# Patient Record
Sex: FEMALE | Race: BLACK OR AFRICAN AMERICAN | NOT HISPANIC OR LATINO | Employment: OTHER | URBAN - METROPOLITAN AREA
[De-identification: names, ages, dates, MRNs, and addresses within clinical notes are randomized per-mention and may not be internally consistent; named-entity substitution may affect disease eponyms.]

---

## 2017-01-10 ENCOUNTER — OFFICE VISIT (OUTPATIENT)
Dept: INTERNAL MEDICINE | Facility: CLINIC | Age: 72
End: 2017-01-10

## 2017-01-10 VITALS
BODY MASS INDEX: 32.58 KG/M2 | OXYGEN SATURATION: 100 % | HEART RATE: 83 BPM | HEIGHT: 68 IN | DIASTOLIC BLOOD PRESSURE: 86 MMHG | WEIGHT: 215 LBS | SYSTOLIC BLOOD PRESSURE: 140 MMHG

## 2017-01-10 DIAGNOSIS — E66.9 OBESITY (BMI 30.0-34.9): Chronic | ICD-10-CM

## 2017-01-10 DIAGNOSIS — H81.10 BPPV (BENIGN PAROXYSMAL POSITIONAL VERTIGO), UNSPECIFIED LATERALITY: ICD-10-CM

## 2017-01-10 DIAGNOSIS — J30.5 NON-SEASONAL ALLERGIC RHINITIS DUE TO FOOD: ICD-10-CM

## 2017-01-10 DIAGNOSIS — I10 ESSENTIAL HYPERTENSION: Primary | Chronic | ICD-10-CM

## 2017-01-10 PROCEDURE — 99214 OFFICE O/P EST MOD 30 MIN: CPT | Performed by: HOSPITALIST

## 2017-01-10 RX ORDER — LEVOCETIRIZINE DIHYDROCHLORIDE 5 MG/1
5 TABLET, FILM COATED ORAL DAILY
COMMUNITY
End: 2017-01-10 | Stop reason: SDUPTHER

## 2017-01-10 RX ORDER — LEVOCETIRIZINE DIHYDROCHLORIDE 5 MG/1
5 TABLET, FILM COATED ORAL DAILY
Qty: 30 TABLET | Refills: 5 | Status: SHIPPED | OUTPATIENT
Start: 2017-01-10 | End: 2017-01-10 | Stop reason: SDUPTHER

## 2017-01-10 RX ORDER — LEVOCETIRIZINE DIHYDROCHLORIDE 5 MG/1
5 TABLET, FILM COATED ORAL DAILY
Qty: 90 TABLET | Refills: 3 | Status: SHIPPED | OUTPATIENT
Start: 2017-01-10 | End: 2019-06-12

## 2017-01-10 NOTE — PROGRESS NOTES
"Subjective   Maryjane Gaspar is a 71 y.o. female.   Abnormal Calcium; Essential hypertension; and Med Refill      HPI Comments: She has been doing well.  Her BP is stable. She states it was running a little higher than normal in [place]. She has been having some dizziness with position changes since the beginning of the year. She has episodes 2-3 a day. It is vertiginous. Not associated with nausea it last only a few seconds.        The following portions of the patient's history were reviewed and updated as appropriate: allergies, current medications, past family history, past medical history, past social history, past surgical history and problem list.    Review of Systems   Constitutional: Negative for activity change and appetite change.   HENT: Positive for postnasal drip and rhinorrhea. Negative for congestion and ear pain.    Eyes: Negative for discharge and itching.   Respiratory: Negative for apnea and chest tightness.    Cardiovascular: Negative for chest pain and leg swelling.   Genitourinary: Negative for difficulty urinating and dyspareunia.   Musculoskeletal: Negative for arthralgias and back pain.       Objective  Blood pressure 140/86, pulse 83, height 68\" (172.7 cm), weight 215 lb (97.5 kg), SpO2 100 %.      Physical Exam   Constitutional: She is oriented to person, place, and time. She appears well-developed and well-nourished.   HENT:   Head: Normocephalic and atraumatic.   Eyes: Conjunctivae and EOM are normal. Pupils are equal, round, and reactive to light.   Neck: Normal range of motion. Neck supple.   Cardiovascular: Normal rate, regular rhythm, normal heart sounds and intact distal pulses.    Pulmonary/Chest: Effort normal and breath sounds normal.   Neurological: She is alert and oriented to person, place, and time.   Skin: Skin is warm and dry.   Psychiatric: She has a normal mood and affect. Her behavior is normal. Judgment and thought content normal.       Assessment/Plan   Maryjane was " seen today for abnormal calcium, essential hypertension and med refill.    Diagnoses and all orders for this visit:    Essential hypertension    Obesity (BMI 30.0-34.9)    Non-seasonal allergic rhinitis due to food  -     Discontinue: levocetirizine (XYZAL) 5 MG tablet; Take 1 tablet by mouth Daily.  -     levocetirizine (XYZAL) 5 MG tablet; Take 1 tablet by mouth Daily.    BPPV (benign paroxysmal positional vertigo), unspecified laterality  -     Ambulatory Referral to Physical Therapy

## 2017-01-10 NOTE — MR AVS SNAPSHOT
Maryjane Gaspar   1/10/2017 9:15 AM   Office Visit    Dept Phone:  [phone]   Encounter #:  [id number]    Provider:  Ralph Hinson MD   Department:  [facility] INTERNAL MEDICINE AND ENDOCRINOLOGY [place]                Your Full Care Plan              Where to Get Your Medications      You can get these medications from any pharmacy     Bring a paper prescription for each of these medications     levocetirizine 5 MG tablet            Your Updated Medication List          This list is accurate as of: 1/10/17 11:18 AM.  Always use your most recent med list.                amLODIPine 5 MG tablet   Commonly known as:  NORVASC   Take 1 tablet by mouth Daily.       aspirin 81 MG tablet       levocetirizine 5 MG tablet   Commonly known as:  XYZAL   Take 1 tablet by mouth Daily.       MULTIVITAMINS PO               We Performed the Following     Ambulatory Referral to Physical Therapy       You Were Diagnosed With        Codes Comments    Essential hypertension    -  Primary ICD-10-CM: I10  ICD-9-CM: 401.9     Obesity (BMI 30.0-34.9)     ICD-10-CM: E66.9  ICD-9-CM: 278.00     Non-seasonal allergic rhinitis due to food     ICD-10-CM: J30.5  ICD-9-CM: 477.1     BPPV (benign paroxysmal positional vertigo), unspecified laterality     ICD-10-CM: H81.10  ICD-9-CM: 386.11       Instructions     None    Patient Instructions History      Upcoming Appointments     Visit Type Date Time Department    FOLLOW UP 1/10/2017  9:15 AM [facility]    FOLLOW UP 4/10/2017  9:30 AM [facility]    SUBSEQUENT MEDICARE WELLNESS 9/15/2017  9:45 AM [facility]      MyChart Signup     Our records indicate that your [facility] [employer] account has been deactivated. If you would like to reactivate your account, please email [email or web address] or call [phone] to talk to our [employer] staff.             Other Info from Your Visit           Your Appointments     Apr 10, 2017  9:30 AM EDT   Follow  "Up with Ralph Hinson MD   [facility] [place] (--)    [address]  [facility] [address] [phone]           Arrive 15 minutes prior to appointment.            Sep 15, 2017  9:45 AM EDT   Subsequent Medicare Wellness with Ralph Hinson MD   [facility] [place] (--)    [address]  [facility] [address] [phone]              Allergies     No Known Allergies      Reason for Visit     Abnormal Calcium     Essential hypertension     Med Refill           Vital Signs     Blood Pressure Pulse Height Weight Oxygen Saturation Body Mass Index    140/86 (BP Location: Left arm, Patient Position: Lying) 83 68\" (172.7 cm) 215 lb (97.5 kg) 100% 32.69 kg/m2    Smoking Status                   Former Smoker           Problems and Diagnoses Noted     Nasal inflammation due to allergen    Benign positional vertigo    High blood pressure    Obesity        "

## 2017-02-02 ENCOUNTER — HOSPITAL ENCOUNTER (OUTPATIENT)
Dept: PHYSICAL THERAPY | Facility: HOSPITAL | Age: 72
Setting detail: THERAPIES SERIES
Discharge: HOME | End: 2017-02-02

## 2017-02-02 DIAGNOSIS — H81.10 BPPV (BENIGN PAROXYSMAL POSITIONAL VERTIGO), UNSPECIFIED LATERALITY: Primary | ICD-10-CM

## 2017-02-02 PROCEDURE — 97162 PT EVAL MOD COMPLEX 30 MIN: CPT | Performed by: PHYSICAL THERAPIST

## 2017-02-02 PROCEDURE — G8981 BODY POS CURRENT STATUS: HCPCS | Performed by: PHYSICAL THERAPIST

## 2017-02-02 PROCEDURE — G8982 BODY POS GOAL STATUS: HCPCS | Performed by: PHYSICAL THERAPIST
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
## 2017-02-08 ENCOUNTER — HOSPITAL ENCOUNTER (OUTPATIENT)
Dept: PHYSICAL THERAPY | Facility: HOSPITAL | Age: 72
Setting detail: THERAPIES SERIES
Discharge: HOME | End: 2017-02-08

## 2017-02-08 DIAGNOSIS — H81.10 BPPV (BENIGN PAROXYSMAL POSITIONAL VERTIGO), UNSPECIFIED LATERALITY: Primary | ICD-10-CM

## 2017-02-08 PROCEDURE — 97112 NEUROMUSCULAR REEDUCATION: CPT | Performed by: PHYSICAL THERAPIST
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
## 2017-02-26 DIAGNOSIS — I10 ESSENTIAL HYPERTENSION: Chronic | ICD-10-CM

## 2017-02-27 RX ORDER — AMLODIPINE BESYLATE 5 MG/1
TABLET ORAL
Qty: 90 TABLET | Refills: 1 | Status: SHIPPED | OUTPATIENT
Start: 2017-02-27 | End: 2017-08-25 | Stop reason: SDUPTHER

## 2017-03-13 ENCOUNTER — DOCUMENTATION (OUTPATIENT)
Dept: PHYSICAL THERAPY | Facility: HOSPITAL | Age: 72
End: 2017-03-13

## 2017-03-13 DIAGNOSIS — H81.10 BPPV (BENIGN PAROXYSMAL POSITIONAL VERTIGO), UNSPECIFIED LATERALITY: Primary | ICD-10-CM

## 2017-03-13 NOTE — THERAPY DISCHARGE NOTE
Outpatient Physical Therapy Discharge Summary         Patient Name: Maryjane Gaspar  : 1945  MRN: [id number]    Today's Date: 3/13/2017    Visit Dx:    ICD-10-CM ICD-9-CM   1. BPPV (benign paroxysmal positional vertigo), unspecified laterality H81.10 386.11             PT OP Goals       17 0900       PT Short Term Goals    STG 1 Patient to report reduction of dizziness at least 75% in frequency.  -SILVIA     STG 1 Progress Met  -SILVIA     STG 2 Patient to be independent in repositioning techniques.  -SILVIA     STG 2 Progress Met  -SILVIA     STG 3 Patient to demonstrate negative positional testing  -SILVIA     STG 3 Progress Met  -SILVIA       User Key  (r) = Recorded By, (t) = Taken By, (c) = Cosigned By    Initials Name Provider Type    SILVIA Vargas, PT Physical Therapist          OP PT Discharge Summary  Date of Discharge: 17  Reason for Discharge: All goals achieved  Outcomes Achieved: Able to achieve all goals within established timeline  Discharge Destination: Home with home program  Discharge Instructions: Patient responded very favorably to repositioning techniques.  Reported near symptom resolution with just one visit.  All goals were met and she was independent with self repositioning techniques    Attended 2 visits, all goals met.  No need for further care.      Ranjan Vargas, PT  3/13/2017

## 2017-04-12 ENCOUNTER — OFFICE VISIT (OUTPATIENT)
Dept: INTERNAL MEDICINE | Facility: CLINIC | Age: 72
End: 2017-04-12

## 2017-04-12 VITALS
TEMPERATURE: 97.9 F | SYSTOLIC BLOOD PRESSURE: 134 MMHG | HEIGHT: 68 IN | BODY MASS INDEX: 32.13 KG/M2 | WEIGHT: 212 LBS | DIASTOLIC BLOOD PRESSURE: 82 MMHG | HEART RATE: 73 BPM | OXYGEN SATURATION: 100 %

## 2017-04-12 DIAGNOSIS — J40 BRONCHITIS: Primary | ICD-10-CM

## 2017-04-12 PROCEDURE — 99213 OFFICE O/P EST LOW 20 MIN: CPT | Performed by: HOSPITALIST

## 2017-04-12 RX ORDER — METHYLPREDNISOLONE 4 MG/1
TABLET ORAL
Qty: 21 TABLET | Refills: 0 | Status: SHIPPED | OUTPATIENT
Start: 2017-04-12 | End: 2017-04-13 | Stop reason: SDUPTHER

## 2017-04-12 RX ORDER — AZITHROMYCIN 250 MG/1
TABLET, FILM COATED ORAL
Qty: 6 TABLET | Refills: 0 | Status: SHIPPED | OUTPATIENT
Start: 2017-04-12 | End: 2017-04-13 | Stop reason: SDUPTHER

## 2017-04-12 NOTE — PROGRESS NOTES
Subjective   Maryjane Gaspar is a 72 y.o. female. Essential hypertension; Obesity (BMI 30.0-34.9); BPPV (benign paroxysmal positional vertigo), unspecified lat; Cough; URI; and phlegm from chest (sometimes red tinged)         HPI Comments: She has been sick for 3 weeks. Still coughing up grey tannish mucous. She has mild shortness of breath and wheezing. She also has concerns about the discoloration of her lips that remains since the swelling of her lips went down. She wanted a referral to some one who specializes in AA skin but I was not aware of any practitioner's in this area with those expertise.      The following portions of the patient's history were reviewed and updated as appropriate: allergies, current medications, past family history, past medical history, past social history, past surgical history and problem list.    Review of Systems   Constitutional: Positive for fatigue. Negative for fever.   HENT: Negative for ear discharge and ear pain.    Eyes: Negative for discharge and itching.   Respiratory: Positive for shortness of breath and wheezing.    Cardiovascular: Negative for chest pain and leg swelling.   Genitourinary: Negative for difficulty urinating and dyspareunia.       Objective   Vitals:    04/12/17 1220   BP: 134/82   Pulse: 73   Temp: 97.9 °F (36.6 °C)   SpO2: 100%       Physical Exam   Constitutional: She appears well-developed and well-nourished.   HENT:   Head: Normocephalic and atraumatic.   Right Ear: External ear normal.   Left Ear: External ear normal.   Eyes: EOM are normal. Pupils are equal, round, and reactive to light.   Neck: Normal range of motion. Neck supple.   Cardiovascular: Normal rate, regular rhythm, normal heart sounds and intact distal pulses.    Pulmonary/Chest: Effort normal and breath sounds normal.       Assessment/Plan   Maryjane was seen today for essential hypertension, obesity (bmi 30.0-34.9), bppv (benign paroxysmal positional vertigo), unspecified lat, cough,  uri and phlegm from chest.    Diagnoses and all orders for this visit:    Bronchitis  -     Discontinue: azithromycin (ZITHROMAX Z-HI) 250 MG tablet; Take 2 tablets the first day, then 1 tablet daily for 4 days.  -     Discontinue: MethylPREDNISolone (MEDROL, HI,) 4 MG tablet; Take as directed on package instructions.        Results for orders placed or performed in visit on 10/13/16   PTH, Intact   Result Value Ref Range    PTH, Intact 45 15 - 65 pg/mL

## 2017-04-13 ENCOUNTER — TELEPHONE (OUTPATIENT)
Dept: INTERNAL MEDICINE | Facility: CLINIC | Age: 72
End: 2017-04-13

## 2017-04-13 DIAGNOSIS — J40 BRONCHITIS: ICD-10-CM

## 2017-04-13 RX ORDER — AZITHROMYCIN 250 MG/1
TABLET, FILM COATED ORAL
Qty: 6 TABLET | Refills: 0 | Status: SHIPPED | OUTPATIENT
Start: 2017-04-13 | End: 2017-09-15

## 2017-04-13 RX ORDER — METHYLPREDNISOLONE 4 MG/1
TABLET ORAL
Qty: 21 TABLET | Refills: 0 | Status: SHIPPED | OUTPATIENT
Start: 2017-04-13 | End: 2017-09-15

## 2017-04-13 NOTE — TELEPHONE ENCOUNTER
----- Message from Linda Kenny sent at 4/13/2017  8:46 AM EDT -----  Contact: PATIENT   RE: RX PRESCRIBED YESTERDAY    DR DOMINGO PRESCRIBED A ZPACK AND A MEDROL DOSE PACK FOR THIS PATIENT DURING HER VISIT YESTERDAY. THEY WERE SENT TO HER MAIL ORDER PHARMACY AND SHOULD HAVE BEEN SENT TO [facility]. PLEASE CANCEL ORDER SENT TO MAIL ORDER.    CALL BACK #[phone]

## 2017-04-20 ENCOUNTER — TELEPHONE (OUTPATIENT)
Dept: INTERNAL MEDICINE | Facility: CLINIC | Age: 72
End: 2017-04-20

## 2017-04-20 DIAGNOSIS — R05.9 COUGH: Primary | ICD-10-CM

## 2017-04-20 RX ORDER — BENZONATATE 200 MG/1
200 CAPSULE ORAL 3 TIMES DAILY PRN
Qty: 60 CAPSULE | Refills: 0 | Status: SHIPPED | OUTPATIENT
Start: 2017-04-20 | End: 2017-09-15

## 2017-04-20 NOTE — TELEPHONE ENCOUNTER
----- Message from Gaby Ngo sent at 4/19/2017  8:24 AM EDT -----  Contact: pt  Pt saw dr sanchez last week for a slight productive cough, thick mucus build up ... Pt was given two medicines and she has completely finished both prescriptions and mucinax dm but is still having the same symptoms.    Please call patient back with next treatment.

## 2017-04-20 NOTE — TELEPHONE ENCOUNTER
Sent in a script for tessalon  This is a post viral cough  that will have to run its course and the tessalon with help reduce the coughing. If she is not better by next week. Have her come in for a chest xray.

## 2017-06-06 ENCOUNTER — TELEPHONE (OUTPATIENT)
Dept: INTERNAL MEDICINE | Facility: CLINIC | Age: 72
End: 2017-06-06

## 2017-06-06 DIAGNOSIS — S69.90XS FINGER INJURY, UNSPECIFIED LATERALITY, SEQUELA: Primary | ICD-10-CM

## 2017-06-06 NOTE — TELEPHONE ENCOUNTER
MS TAY INJURED HER FINGER WHILE AWAY IN [place] AND WAS REFERRED TO A HAND SURGEON THERE WHO HAD BEEN FOLLOWING ALONG WITH HER CARE. NOW THAT SHE IS BACK IN [place], SHE CALLED TO REQUEST A REFERRAL FROM DR DOMINGO TO BE ABLE TO SEE A HAND SURGEON HERE FOR CONTINUED FOLLOW UP CARE. THE INJURY IS CALLED MALLET FINGER.

## 2017-08-25 ENCOUNTER — TELEPHONE (OUTPATIENT)
Dept: INTERNAL MEDICINE | Facility: CLINIC | Age: 72
End: 2017-08-25

## 2017-08-25 DIAGNOSIS — I10 ESSENTIAL HYPERTENSION: Chronic | ICD-10-CM

## 2017-08-25 RX ORDER — AMLODIPINE BESYLATE 5 MG/1
5 TABLET ORAL DAILY
Qty: 90 TABLET | Refills: 0 | Status: SHIPPED | OUTPATIENT
Start: 2017-08-25 | End: 2017-09-15 | Stop reason: SDUPTHER

## 2017-08-25 RX ORDER — AMLODIPINE BESYLATE 5 MG/1
5 TABLET ORAL DAILY
Qty: 90 TABLET | Refills: 0 | Status: SHIPPED | OUTPATIENT
Start: 2017-08-25 | End: 2017-08-25 | Stop reason: SDUPTHER

## 2017-09-06 ENCOUNTER — TRANSCRIBE ORDERS (OUTPATIENT)
Dept: PHYSICAL THERAPY | Facility: CLINIC | Age: 72
End: 2017-09-06

## 2017-09-06 DIAGNOSIS — M20.012 MALLET FINGER, LEFT: Primary | ICD-10-CM

## 2017-09-12 ENCOUNTER — TREATMENT (OUTPATIENT)
Dept: PHYSICAL THERAPY | Facility: CLINIC | Age: 72
End: 2017-09-12

## 2017-09-12 DIAGNOSIS — M79.642 PAIN OF LEFT HAND: Primary | ICD-10-CM

## 2017-09-12 PROCEDURE — 97110 THERAPEUTIC EXERCISES: CPT | Performed by: PHYSICAL THERAPIST

## 2017-09-12 PROCEDURE — G8984 CARRY CURRENT STATUS: HCPCS | Performed by: PHYSICAL THERAPIST

## 2017-09-12 PROCEDURE — 97140 MANUAL THERAPY 1/> REGIONS: CPT | Performed by: PHYSICAL THERAPIST

## 2017-09-12 PROCEDURE — 97022 WHIRLPOOL THERAPY: CPT | Performed by: PHYSICAL THERAPIST

## 2017-09-12 PROCEDURE — G8985 CARRY GOAL STATUS: HCPCS | Performed by: PHYSICAL THERAPIST

## 2017-09-12 NOTE — PROGRESS NOTES
Physical Therapy Initial Evaluation and Plan of Care    Patient: Maryjane Gaspar   : 1945  Diagnosis/ICD-10 Code:  Pain of left hand [M79.642]  Referring practitioner: Paula Pozo MD  Date of Initial Visit: 2017  Today's Date: 2017  Patient seen for 1 sessions             Subjective Evaluation    History of Present Illness  Date of onset: 2017  Mechanism of injury: Pt was lifitng a backpack and she lifted with the middle finger of the left hand and she didn't have any pain, but about 10-15 minutes later she noticed that she couldn't move the tip of the left middle finger up. She worse a splint at all times for 12 weeks and then went another 4 weeks of wearing the splint at night and with activity. She now has c/o of some burning and aching in the left hand and stiffness with movement of the middle, ring, and small fingers.     Pt c/o some numbness in the hand and in the 4 fingers on the dorsum of the left hand       Patient Occupation: retired  Quality of life: good    Pain  Current pain ratin  At best pain ratin  At worst pain ratin  Quality: burning  Relieving factors: medications and rest  Aggravating factors: lifting (use of the left hand)  Progression: improved    Hand dominance: right    Treatments  Previous treatment: immobilization  Patient Goals  Patient goals for therapy: decreased pain, increased motion, increased strength and independence with ADLs/IADLs             Objective     Palpation     Additional Palpation Details  Mild TTP about the left long finger DIP joint     Active Range of Motion     Left Wrist   Normal active range of motion    Right Wrist   Normal active range of motion    Left Digits    Flexion   Index     MCP: 72    PIP: 89    DIP: 33  Middle     MCP: 82    PIP: 35    DIP: 3  Ring     MCP: 80    PIP: 52    DIP: 2  Little     MCP: 78    PIP: 40    DIP: 18    Strength/Myotome Testing     Left Wrist/Hand      (2nd hand position)     Trial  1: 24    Thumb Strength  Key/Lateral Pinch     [place] 1: 7  Palmar/Three-Point Pinch     Trial 1: 7    Right Wrist/Hand      (2nd hand position)     Trial 1: 75    Thumb Strength   Key/Lateral Pinch     Trial 1: 20  Palmar/Three-Point Pinch     Trial 1: 18         Assessment & Plan     Assessment  Impairments: abnormal muscle firing, abnormal muscle tone, abnormal or restricted ROM, activity intolerance, impaired physical strength, lacks appropriate home exercise program and pain with function  Assessment details: Patient is a 72 year old female who comes to physical therapy s/p left long finger mallet with prolonged immobilization and resulting stiffness in all fingers of the left hand resulting in pain, decreased ROM, decreased strength, and inability to perform all essential functional activities. Pt will benefit from skilled PT services to address the above issues.     Prognosis details:   SHORT TERM GOALS:   2 weeks    1. Pt to be I with HEP  2. Left hand/wrist AROM equal to that of the right hand/wrist to show improve mobility   3. Pt to report 0/10 pain at rest in the left hand    LONG TERM GOALS:    4 weeks  1. Pt to demonstrate left  strength equal to that of right  for improved ability to perform lifting activities  2. Pt to report being able to return to work full duty without limitation or exacerbation of symptoms  3. Pt to demonstrate ability to perform 20# box lift without pain in the left  hand      Functional Limitations: carrying objects, lifting, pulling, pushing, uncomfortable because of pain, reaching overhead and unable to perform repetitive tasks  Plan  Therapy options: will be seen for skilled physical therapy services  Planned modality interventions: cryotherapy, electrical stimulation/Russian stimulation, fluidotherapy, high voltage pulsed current (pain management), iontophoresis, microcurrent electrical stimulation, TENS, thermotherapy (hydrocollator packs) and ultrasound  Planned  therapy interventions: ADL retraining, body mechanics training, fine motor coordination training, flexibility, functional ROM exercises, home exercise program, IADL retraining, joint mobilization, manual therapy, motor coordination training, neuromuscular re-education, soft tissue mobilization, strengthening, stretching and therapeutic activities  Frequency: 2x week  Duration in weeks: 6        Manual Therapy:    12     mins  33458;  Therapeutic Exercise:    18     mins  41262;     Neuromuscular Edwin:        mins  46322;    Therapeutic Activity:          mins  35105;     Gait Training:           mins  49947;     Ultrasound:          mins  45083;    Electrical Stimulation:         mins  26774 ( );  Dry Needling          mins self-pay    Timed Treatment:   30   mins   Total Treatment:     74   mins    PT SIGNATURE: Brennen Espinal, MONISHA   DATE TREATMENT INITIATED: 9/12/2017    Initial Certification  Certification Period: 12/11/2017  I certify that the therapy services are furnished while this patient is under my care.  The services outlined above are required by this patient, and will be reviewed every 90 days.     PHYSICIAN: Paula Pozo MD      DATE:     Please sign and return via fax to [phone].. Thank you, [facility].

## 2017-09-15 ENCOUNTER — OFFICE VISIT (OUTPATIENT)
Dept: INTERNAL MEDICINE | Facility: CLINIC | Age: 72
End: 2017-09-15

## 2017-09-15 VITALS
DIASTOLIC BLOOD PRESSURE: 80 MMHG | WEIGHT: 214.73 LBS | BODY MASS INDEX: 32.65 KG/M2 | SYSTOLIC BLOOD PRESSURE: 142 MMHG | HEART RATE: 78 BPM | OXYGEN SATURATION: 99 %

## 2017-09-15 DIAGNOSIS — Z11.59 NEED FOR HEPATITIS C SCREENING TEST: ICD-10-CM

## 2017-09-15 DIAGNOSIS — I10 ESSENTIAL HYPERTENSION: Chronic | ICD-10-CM

## 2017-09-15 DIAGNOSIS — Z00.00 MEDICARE ANNUAL WELLNESS VISIT, SUBSEQUENT: Primary | ICD-10-CM

## 2017-09-15 LAB
ALBUMIN SERPL-MCNC: 4.3 G/DL (ref 3.2–4.8)
ALBUMIN/GLOB SERPL: 1.7 G/DL (ref 1.5–2.5)
ALP SERPL-CCNC: 106 U/L (ref 25–100)
ALT SERPL W P-5'-P-CCNC: 15 U/L (ref 7–40)
ANION GAP SERPL CALCULATED.3IONS-SCNC: 9 MMOL/L (ref 3–11)
AST SERPL-CCNC: 18 U/L (ref 0–33)
BILIRUB SERPL-MCNC: 0.7 MG/DL (ref 0.3–1.2)
BUN BLD-MCNC: 12 MG/DL (ref 9–23)
BUN/CREAT SERPL: 13.3 (ref 7–25)
CALCIUM SPEC-SCNC: 9.8 MG/DL (ref 8.7–10.4)
CHLORIDE SERPL-SCNC: 107 MMOL/L (ref 99–109)
CO2 SERPL-SCNC: 27 MMOL/L (ref 20–31)
CREAT BLD-MCNC: 0.9 MG/DL (ref 0.6–1.3)
GFR SERPL CREATININE-BSD FRML MDRD: 75 ML/MIN/1.73
GLOBULIN UR ELPH-MCNC: 2.5 GM/DL
GLUCOSE BLD-MCNC: 87 MG/DL (ref 70–100)
HCV AB SER DONR QL: NORMAL
POTASSIUM BLD-SCNC: 3.7 MMOL/L (ref 3.5–5.5)
PROT SERPL-MCNC: 6.8 G/DL (ref 5.7–8.2)
SODIUM BLD-SCNC: 143 MMOL/L (ref 132–146)

## 2017-09-15 PROCEDURE — G0444 DEPRESSION SCREEN ANNUAL: HCPCS | Performed by: NURSE PRACTITIONER

## 2017-09-15 PROCEDURE — 86803 HEPATITIS C AB TEST: CPT | Performed by: NURSE PRACTITIONER

## 2017-09-15 PROCEDURE — G0439 PPPS, SUBSEQ VISIT: HCPCS | Performed by: NURSE PRACTITIONER

## 2017-09-15 PROCEDURE — 80053 COMPREHEN METABOLIC PANEL: CPT | Performed by: NURSE PRACTITIONER

## 2017-09-15 RX ORDER — AMLODIPINE BESYLATE 5 MG/1
5 TABLET ORAL DAILY
Qty: 90 TABLET | Refills: 3 | Status: SHIPPED | OUTPATIENT
Start: 2017-09-15 | End: 2018-09-16 | Stop reason: SDUPTHER

## 2017-09-15 NOTE — PROGRESS NOTES
QUICK REFERENCE INFORMATION:  The ABCs of the Annual Wellness Visit    Subsequent Medicare Wellness Visit    HEALTH RISK ASSESSMENT    1945    Recent Hospitalizations:  No hospitalization(s) within the last year..        Current Medical Providers:  Patient Care Team:  VAL Carver as PCP - General (Internal Medicine)  MARIA DEL ROSARIO Church as PCP - Claims Attributed        Smoking Status:  History   Smoking Status   • Former Smoker   • Quit date: 1984   Smokeless Tobacco   • Former User       Alcohol Consumption:  History   Alcohol Use   • Yes     Comment: Red wine 3-4 times a month       Depression Screen:   PHQ-2/PHQ-9 Depression Screening 9/15/2017   Little interest or pleasure in doing things 0   Feeling down, depressed, or hopeless 0   Trouble falling or staying asleep, or sleeping too much -   Feeling tired or having little energy -   Poor appetite or overeating -   Feeling bad about yourself - or that you are a failure or have let yourself or your family down -   Trouble concentrating on things, such as reading the newspaper or watching television -   Moving or speaking so slowly that other people could have noticed. Or the opposite - being so fidgety or restless that you have been moving around a lot more than usual -   Thoughts that you would be better off dead, or of hurting yourself in some way -   Total Score 0   If you checked off any problems, how difficult have these problems made it for you to do your work, take care of things at home, or get along with other people? -       Health Habits and Functional and Cognitive Screening:  Functional & Cognitive Status 9/15/2017   Do you have difficulty preparing food and eating? No   Do you have difficulty bathing yourself? No   Do you have difficulty getting dressed? No   Do you have difficulty using the toilet? No   Do you have difficulty moving around from place to place? No   In the past year have you fallen or experienced a near fall? No   Do  you need help using the phone?  No   Are you deaf or do you have serious difficulty hearing?  No   Do you need help with transportation? No   Do you need help shopping? No   Do you need help preparing meals?  No   Do you need help with housework?  No   Do you need help with laundry? No   Do you need help taking your medications? No   Do you need help managing money? No   Do you have difficulty concentrating, remembering or making decisions? -       Health Habits  Current Diet: Well Balanced Diet  Dental Exam: Up to date  Eye Exam: Up to date  Exercise (times per week): 0 times per week  Current Exercise Activities Include: None      Does the patient have evidence of cognitive impairment? Yes    Aspirin use counseling: Taking ASA appropriately as indicated      Recent Lab Results:  CMP:  Lab Results   Component Value Date    BUN 7 (L) 09/06/2016    CREATININE 1.00 09/06/2016    EGFRIFAFRI 66 09/06/2016    BCR 7.0 09/06/2016     09/06/2016    K 3.6 09/06/2016    CO2 32.0 (H) 09/06/2016    CALCIUM 10.6 (H) 09/06/2016    ALBUMIN 4.30 09/06/2016    LABIL2 1.6 09/06/2016    BILITOT 0.6 09/06/2016    ALKPHOS 78 09/06/2016    AST 25 09/06/2016    ALT 16 09/06/2016     Lipid Panel:  Lab Results   Component Value Date    CHOL 159 09/06/2016    TRIG 89 09/06/2016    HDL 63 (H) 09/06/2016    LDLDIRECT 81 09/06/2016     HbA1c:       Visual Acuity:  No exam data present    Age-appropriate Screening Schedule:  Refer to the list below for future screening recommendations based on patient's age, sex and/or medical conditions. Orders for these recommended tests are listed in the plan section. The patient has been provided with a written plan.    Health Maintenance   Topic Date Due   • TDAP/TD VACCINES (1 - Tdap) 03/02/1965   • ZOSTER VACCINE  09/06/2016   • PNEUMOCOCCAL VACCINES (65+ LOW/MEDIUM RISK) (2 of 2 - PPSV23) 09/13/2017   • MAMMOGRAM  12/15/2017   • COLONOSCOPY  11/11/2021   • INFLUENZA VACCINE  Addressed         Subjective   History of Present Illness    Maryjane Gaspar is a 72 y.o. female who presents for an Subsequent Wellness Visit.    The following portions of the patient's history were reviewed and updated as appropriate: current medications, past social history, past surgical history and problem list.    Outpatient Medications Prior to Visit   Medication Sig Dispense Refill   • aspirin 81 MG tablet Take  by mouth.     • levocetirizine (XYZAL) 5 MG tablet Take 1 tablet by mouth Daily. 90 tablet 3   • Multiple Vitamin (MULTIVITAMINS PO) Take 1 tablet by mouth daily.     • amLODIPine (NORVASC) 5 MG tablet Take 1 tablet by mouth Daily. 90 tablet 0   • azithromycin (ZITHROMAX Z-HI) 250 MG tablet Take 2 tablets the first day, then 1 tablet daily for 4 days. 6 tablet 0   • benzonatate (TESSALON) 200 MG capsule Take 1 capsule by mouth 3 (Three) Times a Day As Needed for Cough. 60 capsule 0   • MethylPREDNISolone (MEDROL, HI,) 4 MG tablet Take as directed on package instructions. 21 tablet 0     No facility-administered medications prior to visit.        Patient Active Problem List   Diagnosis   • HTN (hypertension)   • Obesity (BMI 30.0-34.9)   • Umbilical hernia   • Osteoarthritis of knee   • Allergic rhinitis   • Lymphocytosis   • Hypercalcemia   • BPPV (benign paroxysmal positional vertigo)       Advance Care Planning:  has NO advance directive - not interested in additional information    Identification of Risk Factors:  Risk factors include: weight  and alcohol use.    Review of Systems   Constitutional: Negative for fatigue.   Respiratory: Negative for apnea, chest tightness and shortness of breath.    Cardiovascular: Negative for chest pain, palpitations and leg swelling.   Skin: Negative for color change.   Psychiatric/Behavioral: The patient is not nervous/anxious.    All other systems reviewed and are negative.      Compared to one year ago, the patient feels her physical health is the same.  Compared to one  year ago, the patient feels her mental health is the same.    Objective     Physical Exam   Constitutional: She is oriented to person, place, and time. She appears well-developed.   HENT:   Head: Normocephalic.   Eyes: Conjunctivae and EOM are normal. Pupils are equal, round, and reactive to light.   Neck: Neck supple. No thyromegaly present.   Cardiovascular: Normal rate, regular rhythm and normal heart sounds.    Pulmonary/Chest: Effort normal and breath sounds normal.   Lymphadenopathy:     She has no cervical adenopathy.   Neurological: She is alert and oriented to person, place, and time.   Skin: Skin is warm and dry.   Psychiatric: She has a normal mood and affect. Her behavior is normal. Judgment and thought content normal.   Nursing note and vitals reviewed.      Vitals:    09/15/17 0921   BP: 142/80   Pulse: 78   SpO2: 99%   Weight: 214 lb 11.7 oz (97.4 kg)       Body mass index is 32.65 kg/(m^2).  Discussed the patient's BMI with her. The BMI is above average; BMI management plan is completed.    Assessment/Plan   Patient Self-Management and Personalized Health Advice  The patient has been provided with information about: diet, exercise and weight management and preventive services including:   · Advance directive, Influenza vaccine.    Visit Diagnoses:    ICD-10-CM ICD-9-CM   1. Medicare annual wellness visit, subsequent Z00.00 V70.0   2. Essential hypertension I10 401.9   3. Need for hepatitis C screening test Z11.59 V73.89       Orders Placed This Encounter   Procedures   • Comprehensive Metabolic Panel   • Hepatitis C antibody       Outpatient Encounter Prescriptions as of 9/15/2017   Medication Sig Dispense Refill   • amLODIPine (NORVASC) 5 MG tablet Take 1 tablet by mouth Daily. 90 tablet 3   • aspirin 81 MG tablet Take  by mouth.     • levocetirizine (XYZAL) 5 MG tablet Take 1 tablet by mouth Daily. 90 tablet 3   • Multiple Vitamin (MULTIVITAMINS PO) Take 1 tablet by mouth daily.     •  [DISCONTINUED] amLODIPine (NORVASC) 5 MG tablet Take 1 tablet by mouth Daily. 90 tablet 0   • [DISCONTINUED] azithromycin (ZITHROMAX Z-HI) 250 MG tablet Take 2 tablets the first day, then 1 tablet daily for 4 days. 6 tablet 0   • [DISCONTINUED] benzonatate (TESSALON) 200 MG capsule Take 1 capsule by mouth 3 (Three) Times a Day As Needed for Cough. 60 capsule 0   • [DISCONTINUED] MethylPREDNISolone (MEDROL, HI,) 4 MG tablet Take as directed on package instructions. 21 tablet 0     No facility-administered encounter medications on file as of 9/15/2017.        Reviewed use of high risk medication in the elderly: yes  Reviewed for potential of harmful drug interactions in the elderly: yes    Follow Up:  No Follow-up on file.     An After Visit Summary and PPPS with all of these plans were given to the patient.

## 2017-09-21 ENCOUNTER — TREATMENT (OUTPATIENT)
Dept: PHYSICAL THERAPY | Facility: CLINIC | Age: 72
End: 2017-09-21

## 2017-09-21 DIAGNOSIS — M79.642 PAIN OF LEFT HAND: Primary | ICD-10-CM

## 2017-09-21 PROCEDURE — 97110 THERAPEUTIC EXERCISES: CPT | Performed by: PHYSICAL THERAPIST

## 2017-09-21 PROCEDURE — 97140 MANUAL THERAPY 1/> REGIONS: CPT | Performed by: PHYSICAL THERAPIST

## 2017-09-21 PROCEDURE — 97022 WHIRLPOOL THERAPY: CPT | Performed by: PHYSICAL THERAPIST

## 2017-09-25 NOTE — PROGRESS NOTES
Physical Therapy Daily Progress Note    Subjective   Maryjane Gaspar reports that her hand seems to be getting a little better but she is till very stiff in the middle, ring, and small fingers     Objective   See Exercise, Manual, and Modality Logs for complete treatment.       Assessment/Plan     Pt demonstrated improved overall ROM post manual therapy today. Will continue to progress as indicated     Progress per Plan of Care           Manual Therapy:    25     mins  29963;  Therapeutic Exercise:    18     mins  38188;     Neuromuscular Edwin:        mins  08626;    Therapeutic Activity:          mins  80066;     Gait Training:           mins  53434;     Ultrasound:          mins  89305;    Electrical Stimulation:         mins  04866 ( );  Dry Needling          mins self-pay    Timed Treatment:   43   mins   Total Treatment:     65   mins    Brennen Espinal, PT  Physical Therapist

## 2017-09-26 ENCOUNTER — TREATMENT (OUTPATIENT)
Dept: PHYSICAL THERAPY | Facility: CLINIC | Age: 72
End: 2017-09-26

## 2017-09-26 DIAGNOSIS — M79.642 PAIN OF LEFT HAND: Primary | ICD-10-CM

## 2017-09-26 PROCEDURE — 97140 MANUAL THERAPY 1/> REGIONS: CPT | Performed by: PHYSICAL THERAPIST

## 2017-09-26 PROCEDURE — 97110 THERAPEUTIC EXERCISES: CPT | Performed by: PHYSICAL THERAPIST

## 2017-09-27 NOTE — PROGRESS NOTES
Physical Therapy Daily Progress Note    Subjective   Maryjane Gaspar reports that she is still feeling stiff but she has been trying to use her hand more lately     Objective   See Exercise, Manual, and Modality Logs for complete treatment.       Assessment/Plan     Pt had improved ROM measurements in the DIP and PIP joints of all fingers, she is making steady progress with therapy and responding well to manual therapy so far.     Progress per Plan of Care           Manual Therapy:    28     mins  07355;  Therapeutic Exercise:    25     mins  75087;     Neuromuscular Edwin:        mins  50872;    Therapeutic Activity:          mins  87735;     Gait Training:           mins  17514;     Ultrasound:          mins  02971;    Electrical Stimulation:         mins  35314 ( );  Dry Needling          mins self-pay    Timed Treatment:   53   mins   Total Treatment:     70   mins    Brennen Espinal, PT  Physical Therapist

## 2017-09-28 ENCOUNTER — TREATMENT (OUTPATIENT)
Dept: PHYSICAL THERAPY | Facility: CLINIC | Age: 72
End: 2017-09-28

## 2017-09-28 DIAGNOSIS — M79.642 PAIN OF LEFT HAND: Primary | ICD-10-CM

## 2017-09-28 PROCEDURE — 97140 MANUAL THERAPY 1/> REGIONS: CPT | Performed by: PHYSICAL THERAPIST

## 2017-09-28 PROCEDURE — 97018 PARAFFIN BATH THERAPY: CPT | Performed by: PHYSICAL THERAPIST

## 2017-09-29 NOTE — PROGRESS NOTES
Physical Therapy Daily Progress Note    Subjective   Maryjane Gaspar reports that she feels like her hand is doing better, although it got more stiff with ice this week     Objective   See Exercise, Manual, and Modality Logs for complete treatment.       Assessment/Plan     Pt is progressing well, see scanned MD note in media section of epic for detailed objective measures.     Progress per Plan of Care           Manual Therapy:    40     mins  31546;  Therapeutic Exercise:         mins  53468;     Neuromuscular Edwin:        mins  60405;    Therapeutic Activity:          mins  62984;     Gait Training:           mins  25910;     Ultrasound:          mins  32128;    Electrical Stimulation:         mins  04941 ( );  Dry Needling          mins self-pay    Timed Treatment:   40   mins   Total Treatment:     62   mins    Brennen Espinal PT  Physical Therapist

## 2017-10-03 ENCOUNTER — TREATMENT (OUTPATIENT)
Dept: PHYSICAL THERAPY | Facility: CLINIC | Age: 72
End: 2017-10-03

## 2017-10-03 DIAGNOSIS — M79.642 PAIN OF LEFT HAND: Primary | ICD-10-CM

## 2017-10-03 PROCEDURE — 97110 THERAPEUTIC EXERCISES: CPT | Performed by: PHYSICAL THERAPIST

## 2017-10-03 PROCEDURE — 97140 MANUAL THERAPY 1/> REGIONS: CPT | Performed by: PHYSICAL THERAPIST

## 2017-10-05 ENCOUNTER — TREATMENT (OUTPATIENT)
Dept: PHYSICAL THERAPY | Facility: CLINIC | Age: 72
End: 2017-10-05

## 2017-10-05 DIAGNOSIS — M79.642 PAIN OF LEFT HAND: Primary | ICD-10-CM

## 2017-10-05 PROCEDURE — 97018 PARAFFIN BATH THERAPY: CPT | Performed by: PHYSICAL THERAPIST

## 2017-10-05 PROCEDURE — 97140 MANUAL THERAPY 1/> REGIONS: CPT | Performed by: PHYSICAL THERAPIST

## 2017-10-05 PROCEDURE — 97110 THERAPEUTIC EXERCISES: CPT | Performed by: PHYSICAL THERAPIST

## 2017-10-05 NOTE — PROGRESS NOTES
Physical Therapy Daily Progress Note    Subjective   Maryjane Gaspar reports that she is feeling a little better but she feels like her progress is still slow. She goes back to the [place] in 4 weeks     Objective   See Exercise, Manual, and Modality Logs for complete treatment.       Assessment/Plan     Pt continues to have a steady improvement in her finger and hand passive and active ROM. Will continue to progress as tolerated.     Progress per Plan of Care           Manual Therapy:    28     mins  54223;  Therapeutic Exercise:    30     mins  89701;     Neuromuscular Edwin:        mins  60696;    Therapeutic Activity:          mins  08932;     Gait Training:           mins  43647;     Ultrasound:          mins  54143;    Electrical Stimulation:         mins  25563 ( );  Dry Needling          mins self-pay    Timed Treatment:   58   mins   Total Treatment:     76   mins    Brennen Espinal, PT  Physical Therapist

## 2017-10-08 NOTE — PROGRESS NOTES
Physical Therapy Daily Progress Note    Subjective   Maryjane Gaspar reports that she is feeling slightly better, she still has trouble with bending the ends of her fingers and with gripping smaller items     Objective   See Exercise, Manual, and Modality Logs for complete treatment.       Assessment/Plan     Pt is making steady progress with therapy, she will be leaving [facility] for 3 weeks ater next week. Will focus on home program next week    Progress per Plan of Care           Manual Therapy:    35     mins  85074;  Therapeutic Exercise:    18     mins  42690;     Neuromuscular Edwin:        mins  79969;    Therapeutic Activity:          mins  75699;     Gait Training:           mins  22835;     Ultrasound:          mins  18174;    Electrical Stimulation:         mins  60144 ( );  Dry Needling          mins self-pay    Timed Treatment:   53   mins   Total Treatment:     74   mins    Brennen Espinal, PT  Physical Therapist

## 2017-10-10 ENCOUNTER — TREATMENT (OUTPATIENT)
Dept: PHYSICAL THERAPY | Facility: CLINIC | Age: 72
End: 2017-10-10

## 2017-10-10 DIAGNOSIS — M79.642 PAIN OF LEFT HAND: Primary | ICD-10-CM

## 2017-10-10 PROCEDURE — 97018 PARAFFIN BATH THERAPY: CPT | Performed by: PHYSICAL THERAPIST

## 2017-10-10 PROCEDURE — 97140 MANUAL THERAPY 1/> REGIONS: CPT | Performed by: PHYSICAL THERAPIST

## 2017-10-10 NOTE — PROGRESS NOTES
Physical Therapy Daily Progress Note    Subjective   Maryjane Gaspar reports that her fingers are feeling a little stiff today, she has one more visit before she goes to [place] for 3 weeks     Objective   See Exercise, Manual, and Modality Logs for complete treatment.       Assessment/Plan     Pt is making steady progress with therapy and with her ROM. Reinforced working on ROM and stretching at home this visit.     Progress per Plan of Care           Manual Therapy:    38     mins  23115;  Therapeutic Exercise:         mins  35697;     Neuromuscular Edwin:        mins  37599;    Therapeutic Activity:          mins  62388;     Gait Training:           mins  55554;     Ultrasound:          mins  21925;    Electrical Stimulation:         mins  64035 ( );  Dry Needling          mins self-pay    Timed Treatment:   38   mins   Total Treatment:     55   mins    Brennen Espinal PT  Physical Therapist

## 2017-10-12 ENCOUNTER — TREATMENT (OUTPATIENT)
Dept: PHYSICAL THERAPY | Facility: CLINIC | Age: 72
End: 2017-10-12

## 2017-10-12 DIAGNOSIS — M79.642 PAIN OF LEFT HAND: Primary | ICD-10-CM

## 2017-10-12 DIAGNOSIS — M20.012 MALLET FINGER OF LEFT HAND: ICD-10-CM

## 2017-10-12 PROCEDURE — 97140 MANUAL THERAPY 1/> REGIONS: CPT | Performed by: PHYSICAL THERAPIST

## 2017-10-12 PROCEDURE — G0283 ELEC STIM OTHER THAN WOUND: HCPCS | Performed by: PHYSICAL THERAPIST

## 2017-10-12 PROCEDURE — 97110 THERAPEUTIC EXERCISES: CPT | Performed by: PHYSICAL THERAPIST

## 2017-10-16 ENCOUNTER — FLU SHOT (OUTPATIENT)
Dept: INTERNAL MEDICINE | Facility: CLINIC | Age: 72
End: 2017-10-16

## 2017-10-16 DIAGNOSIS — Z23 FLU VACCINE NEED: Primary | ICD-10-CM

## 2017-10-16 PROCEDURE — G0008 ADMIN INFLUENZA VIRUS VAC: HCPCS | Performed by: NURSE PRACTITIONER

## 2017-10-16 PROCEDURE — 90662 IIV NO PRSV INCREASED AG IM: CPT | Performed by: NURSE PRACTITIONER

## 2017-10-16 NOTE — PROGRESS NOTES
Subjective   Maryjane Gaspar reports: All her fingers are stiff and painful to bend.    Objective   END FEEL:  Capsular end feel on all PIP and DIP joints  OBSERVATION:  Minimal extension lag on long finger    See Exercise, Manual, and Modality Logs for complete treatment.       Assessment/Plan  We have instructed the pt she needs to be very aggressive with her PROM on the fingers.  Appears to have adhesive capsulitis.  May consider a flexion glove.  Progress per Plan of Care           Manual Therapy:    20     mins  13153;  Therapeutic Exercise:    25     mins  68934;     Neuromuscular Edwin:        mins  58972;    Therapeutic Activity:          mins  18481;     Gait Training:           mins  82743;     Ultrasound:          mins  05741;   Iontophoresis          mins  16596   Electrical Stimulation:    20     mins  60898 ( );  Dry Needling          mins self-pay  Fluidotherapy          mins 40695    Timed Treatment:   45   mins   Total Treatment:     80   mins    Jignesh Jay, PT, CHT  Physical Therapist

## 2017-11-09 ENCOUNTER — TREATMENT (OUTPATIENT)
Dept: PHYSICAL THERAPY | Facility: CLINIC | Age: 72
End: 2017-11-09

## 2017-11-09 DIAGNOSIS — M20.012 MALLET FINGER OF LEFT HAND: ICD-10-CM

## 2017-11-09 DIAGNOSIS — M79.642 PAIN OF LEFT HAND: Primary | ICD-10-CM

## 2017-11-09 PROCEDURE — 97140 MANUAL THERAPY 1/> REGIONS: CPT | Performed by: PHYSICAL THERAPIST

## 2017-11-09 PROCEDURE — 97018 PARAFFIN BATH THERAPY: CPT | Performed by: PHYSICAL THERAPIST

## 2017-11-13 NOTE — PROGRESS NOTES
Physical Therapy Daily Progress Note    Subjective   Maryjane Gaspar reports that she feels like she has been making some progress, she reports that she used parafin while she was out of town and she worked on her ROM as well.     Objective   See Exercise, Manual, and Modality Logs for complete treatment.       Assessment/Plan     Pt is making steady progress with therapy, although it is slow. Measurements were taken today and she demonstrated improvement in ROM at all joints of the middle, ring, and small fingers of the left hand.     Progress per Plan of Care           Manual Therapy:    35     mins  97998;  Therapeutic Exercise:         mins  73566;     Neuromuscular Edwin:        mins  98963;    Therapeutic Activity:          mins  46418;     Gait Training:           mins  83594;     Ultrasound:          mins  67853;    Electrical Stimulation:         mins  15601 ( );  Dry Needling          mins self-pay    Timed Treatment:   35   mins   Total Treatment:     56   mins    Brennen Espinal, PT  Physical Therapist

## 2017-11-14 ENCOUNTER — TREATMENT (OUTPATIENT)
Dept: PHYSICAL THERAPY | Facility: CLINIC | Age: 72
End: 2017-11-14

## 2017-11-14 DIAGNOSIS — M79.642 PAIN OF LEFT HAND: Primary | ICD-10-CM

## 2017-11-14 DIAGNOSIS — M20.012 MALLET FINGER OF LEFT HAND: ICD-10-CM

## 2017-11-14 PROCEDURE — 97018 PARAFFIN BATH THERAPY: CPT | Performed by: PHYSICAL THERAPIST

## 2017-11-14 PROCEDURE — 97022 WHIRLPOOL THERAPY: CPT | Performed by: PHYSICAL THERAPIST

## 2017-11-14 PROCEDURE — 97140 MANUAL THERAPY 1/> REGIONS: CPT | Performed by: PHYSICAL THERAPIST

## 2017-11-16 ENCOUNTER — TREATMENT (OUTPATIENT)
Dept: PHYSICAL THERAPY | Facility: CLINIC | Age: 72
End: 2017-11-16

## 2017-11-16 DIAGNOSIS — M79.642 PAIN OF LEFT HAND: Primary | ICD-10-CM

## 2017-11-16 DIAGNOSIS — M20.012 MALLET FINGER OF LEFT HAND: ICD-10-CM

## 2017-11-16 PROCEDURE — 97018 PARAFFIN BATH THERAPY: CPT | Performed by: PHYSICAL THERAPIST

## 2017-11-16 PROCEDURE — 97140 MANUAL THERAPY 1/> REGIONS: CPT | Performed by: PHYSICAL THERAPIST

## 2017-11-16 PROCEDURE — 97110 THERAPEUTIC EXERCISES: CPT | Performed by: PHYSICAL THERAPIST

## 2017-11-16 NOTE — PROGRESS NOTES
Physical Therapy Daily Progress Note    Subjective   Maryjane Gaspar reports that she sarmiento been working on wrapping her hand and fingers with the coban     Objective   See Exercise, Manual, and Modality Logs for complete treatment.       Assessment/Plan     Pt is making incremental progress with therapy but her main limitation is still flexion of the DIP joints of the 3rd-5th fingers.     Progress per Plan of Care           Manual Therapy:    34     mins  94403;  Therapeutic Exercise:         mins  00941;     Neuromuscular Edwin:        mins  75808;    Therapeutic Activity:          mins  31480;     Gait Training:           mins  48873;     Ultrasound:          mins  65346;    Electrical Stimulation:         mins  63099 ( );  Dry Needling          mins self-pay    Timed Treatment:   34   mins   Total Treatment:     70   mins    Brennen Espinal, PT  Physical Therapist

## 2017-11-17 NOTE — PROGRESS NOTES
Physical Therapy Daily Progress Note    Subjective   Maryjane Gaspar reports that she was very sore the night after her last treatment. She thinks it is possibly due to doing the parafin with her hand wrapped with the fingers in flexion     Objective   See Exercise, Manual, and Modality Logs for complete treatment.       Assessment/Plan     Pt Is making incremental progress with therapy. Her main limitation is still flexion of the DIP joints of the 3rd-5th fingers.     Progress per Plan of Care           Manual Therapy:    28     mins  57553;  Therapeutic Exercise:    12     mins  68447;     Neuromuscular Edwin:        mins  14850;    Therapeutic Activity:          mins  35836;     Gait Training:           mins  30731;     Ultrasound:          mins  74515;    Electrical Stimulation:         mins  29788 ( );  Dry Needling          mins self-pay    Timed Treatment:   40   mins   Total Treatment:     60   mins    Brennen Espinal PT  Physical Therapist

## 2017-11-20 ENCOUNTER — TREATMENT (OUTPATIENT)
Dept: PHYSICAL THERAPY | Facility: CLINIC | Age: 72
End: 2017-11-20

## 2017-11-20 DIAGNOSIS — M20.012 MALLET FINGER OF LEFT HAND: ICD-10-CM

## 2017-11-20 DIAGNOSIS — M79.642 PAIN OF LEFT HAND: Primary | ICD-10-CM

## 2017-11-20 PROCEDURE — 97110 THERAPEUTIC EXERCISES: CPT | Performed by: PHYSICAL THERAPIST

## 2017-11-20 PROCEDURE — 97140 MANUAL THERAPY 1/> REGIONS: CPT | Performed by: PHYSICAL THERAPIST

## 2017-11-20 PROCEDURE — 97018 PARAFFIN BATH THERAPY: CPT | Performed by: PHYSICAL THERAPIST

## 2017-11-22 ENCOUNTER — TREATMENT (OUTPATIENT)
Dept: PHYSICAL THERAPY | Facility: CLINIC | Age: 72
End: 2017-11-22

## 2017-11-22 DIAGNOSIS — M20.012 MALLET FINGER OF LEFT HAND: ICD-10-CM

## 2017-11-22 DIAGNOSIS — M79.642 PAIN OF LEFT HAND: Primary | ICD-10-CM

## 2017-11-22 PROCEDURE — 97140 MANUAL THERAPY 1/> REGIONS: CPT | Performed by: PHYSICAL THERAPIST

## 2017-11-22 PROCEDURE — 97110 THERAPEUTIC EXERCISES: CPT | Performed by: PHYSICAL THERAPIST

## 2017-11-22 NOTE — PROGRESS NOTES
Physical Therapy Daily Progress Note    Subjective   Maryjane Gaspar reports that she is still working on the stretching at home and she is using the coban at home to hold her fingers in prolonged flexion     Objective   See Exercise, Manual, and Modality Logs for complete treatment.       Assessment/Plan     Pt is making incremental progress with her ROM, she is still lacking the most in flexion of the DIP joints on digits 3-5    Progress per Plan of Care           Manual Therapy:    28     mins  33676;  Therapeutic Exercise:    18     mins  63975;     Neuromuscular Edwin:        mins  23187;    Therapeutic Activity:          mins  53741;     Gait Training:           mins  77259;     Ultrasound:          mins  38801;    Electrical Stimulation:         mins  37974 ( );  Dry Needling          mins self-pay    Timed Treatment:   46   mins   Total Treatment:     65   mins    Brennen sEpinal, PT  Physical Therapist

## 2017-11-25 NOTE — PROGRESS NOTES
Subjective   Maryjane Gaspar reports: Biggest issue is that she still can't close her fist, last 3 fingers are still very stiff.    Objective   OBSERVATION: Minimal edema  END FEEL: capsular end feel of PIP and DIP joints of LF/RF/SF    See Exercise, Manual, and Modality Logs for complete treatment.       Assessment/Plan  Significant capsulitis of IP joints of RF/SF/LF, she is improving, but progress will be slow.  Progress per Plan of Care           Manual Therapy:    28     mins  63950;  Therapeutic Exercise:    20     mins  69829;     Neuromuscular Edwin:        mins  92873;    Therapeutic Activity:          mins  41690;     Gait Training:           mins  01476;     Ultrasound:          mins  51578;   Iontophoresis          mins  85642   Electrical Stimulation:         mins  72466 ( );  Dry Needling          mins self-pay  Fluidotherapy          mins 60741    Timed Treatment:   48   mins   Total Treatment:     63   mins    Jignesh Jay, PT  Physical Therapist

## 2017-11-27 ENCOUNTER — TREATMENT (OUTPATIENT)
Dept: PHYSICAL THERAPY | Facility: CLINIC | Age: 72
End: 2017-11-27

## 2017-11-27 DIAGNOSIS — M79.642 PAIN OF LEFT HAND: Primary | ICD-10-CM

## 2017-11-27 DIAGNOSIS — M20.012 MALLET FINGER OF LEFT HAND: ICD-10-CM

## 2017-11-27 PROCEDURE — 97110 THERAPEUTIC EXERCISES: CPT | Performed by: PHYSICAL THERAPIST

## 2017-11-27 PROCEDURE — 97140 MANUAL THERAPY 1/> REGIONS: CPT | Performed by: PHYSICAL THERAPIST

## 2017-11-27 PROCEDURE — 97018 PARAFFIN BATH THERAPY: CPT | Performed by: PHYSICAL THERAPIST

## 2017-11-29 NOTE — PROGRESS NOTES
Physical Therapy Daily Progress Note    Subjective   Maryjane Gaspar reports that she feels like her hand is getting better over time, she is going to be out of town for the next several weeks, but she will be using parafin at home and wrapping her fingers into flexion with coban     Objective   See Exercise, Manual, and Modality Logs for complete treatment.       Assessment/Plan     Pt is making steady progress with therapy and she shows improvement in her PROM>AROM of the fingers of the left hand     Progress per Plan of Care           Manual Therapy:    28     mins  28114;  Therapeutic Exercise:    16     mins  16612;     Neuromuscular Edwin:        mins  00603;    Therapeutic Activity:          mins  53024;     Gait Training:           mins  29903;     Ultrasound:          mins  98949;    Electrical Stimulation:         mins  10826 ( );  Dry Needling          mins self-pay    Timed Treatment:   44   mins   Total Treatment:     65   mins    Brennen Espinal, PT  Physical Therapist

## 2017-12-28 ENCOUNTER — TREATMENT (OUTPATIENT)
Dept: PHYSICAL THERAPY | Facility: CLINIC | Age: 72
End: 2017-12-28

## 2017-12-28 DIAGNOSIS — M79.642 PAIN OF LEFT HAND: Primary | ICD-10-CM

## 2017-12-28 DIAGNOSIS — M20.012 MALLET FINGER OF LEFT HAND: ICD-10-CM

## 2017-12-28 PROCEDURE — 97140 MANUAL THERAPY 1/> REGIONS: CPT | Performed by: PHYSICAL THERAPIST

## 2017-12-28 PROCEDURE — 97022 WHIRLPOOL THERAPY: CPT | Performed by: PHYSICAL THERAPIST

## 2017-12-28 PROCEDURE — 97110 THERAPEUTIC EXERCISES: CPT | Performed by: PHYSICAL THERAPIST

## 2018-01-02 NOTE — PROGRESS NOTES
Physical Therapy Daily Progress Note    Subjective   Maryjane Gaspar reports that she is feeling better overall, but she still has trouble with bending the ends of her fingers.     Objective   See Exercise, Manual, and Modality Logs for complete treatment.     See scanned MD note in epic media section     Assessment/Plan    Pt demonstrates an overall improvement in her AROM. Will consider possibility of static flexion splinting on her fingers at the next visit    Progress per Plan of Care           Manual Therapy:    36     mins  18854;  Therapeutic Exercise:    8     mins  23521;     Neuromuscular Edwin:        mins  54325;    Therapeutic Activity:          mins  86935;     Gait Training:           mins  67577;     Ultrasound:          mins  03865;    Electrical Stimulation:         mins  62533 ( );  Dry Needling          mins self-pay    Timed Treatment:   44   mins   Total Treatment:     65   mins    Brennen Espinal PT  Physical Therapist

## 2018-01-09 ENCOUNTER — TREATMENT (OUTPATIENT)
Dept: PHYSICAL THERAPY | Facility: CLINIC | Age: 73
End: 2018-01-09

## 2018-01-09 DIAGNOSIS — M79.642 PAIN OF LEFT HAND: Primary | ICD-10-CM

## 2018-01-09 DIAGNOSIS — M20.012 MALLET FINGER OF LEFT HAND: ICD-10-CM

## 2018-01-09 PROCEDURE — 97022 WHIRLPOOL THERAPY: CPT | Performed by: PHYSICAL THERAPIST

## 2018-01-09 PROCEDURE — 97035 APP MDLTY 1+ULTRASOUND EA 15: CPT | Performed by: PHYSICAL THERAPIST

## 2018-01-09 PROCEDURE — 97140 MANUAL THERAPY 1/> REGIONS: CPT | Performed by: PHYSICAL THERAPIST

## 2018-01-10 NOTE — PROGRESS NOTES
Physical Therapy Daily Progress Note    Subjective   Maryjane Gaspar reports that she is able to do the wrapping of the fingers with less discomfort and when she unwraps and opens her hand she doesn't have the rebound pain.     Objective   See Exercise, Manual, and Modality Logs for complete treatment.       Assessment/Plan     Pt was issued pre-ej static flexion splints for the PIP/DIP of the 3rd and 4th fingers of the left hand to facilitate improved isolated DIP flexion and composite flexion of all fingers.     Progress per Plan of Care           Manual Therapy:    32     mins  15011;  Therapeutic Exercise:         mins  30480;     Neuromuscular Edwin:        mins  54336;    Therapeutic Activity:          mins  78281;     Gait Training:           mins  68731;     Ultrasound:     16     mins  48369;    Electrical Stimulation:         mins  81749 ( );  Dry Needling          mins self-pay    Timed Treatment:   48   mins   Total Treatment:     70   mins    Brennen Espinal, PT  Physical Therapist

## 2018-01-11 ENCOUNTER — TREATMENT (OUTPATIENT)
Dept: PHYSICAL THERAPY | Facility: CLINIC | Age: 73
End: 2018-01-11

## 2018-01-11 DIAGNOSIS — M79.642 PAIN OF LEFT HAND: Primary | ICD-10-CM

## 2018-01-11 DIAGNOSIS — M20.012 MALLET FINGER OF LEFT HAND: ICD-10-CM

## 2018-01-11 PROCEDURE — 97140 MANUAL THERAPY 1/> REGIONS: CPT | Performed by: PHYSICAL THERAPIST

## 2018-01-11 PROCEDURE — 97022 WHIRLPOOL THERAPY: CPT | Performed by: PHYSICAL THERAPIST

## 2018-01-15 NOTE — PROGRESS NOTES
Physical Therapy Daily Progress Note    Subjective   Maryjane Gaspar reports that she has been consistent about wearing the static flexion splints since her last visit.     Objective   See Exercise, Manual, and Modality Logs for complete treatment.       Assessment/Plan     Slight improvement noted in finger flexion of the 3rd and 4th fingers of the left hand, less capsular tightness note throughout available ROM.     Progress per Plan of Care           Manual Therapy:    32     mins  19759;  Therapeutic Exercise:         mins  35287;     Neuromuscular Edwin:        mins  61543;    Therapeutic Activity:          mins  63181;     Gait Training:           mins  60631;     Ultrasound:          mins  87770;    Electrical Stimulation:         mins  40148 ( );  Dry Needling          mins self-pay    Timed Treatment:   32   mins   Total Treatment:     50   mins    Brennen Espinal PT  Physical Therapist

## 2018-01-18 ENCOUNTER — TREATMENT (OUTPATIENT)
Dept: PHYSICAL THERAPY | Facility: CLINIC | Age: 73
End: 2018-01-18

## 2018-01-18 DIAGNOSIS — M79.642 PAIN OF LEFT HAND: Primary | ICD-10-CM

## 2018-01-18 DIAGNOSIS — M20.012 MALLET FINGER OF LEFT HAND: ICD-10-CM

## 2018-01-18 PROCEDURE — 97022 WHIRLPOOL THERAPY: CPT | Performed by: PHYSICAL THERAPIST

## 2018-01-18 PROCEDURE — 97140 MANUAL THERAPY 1/> REGIONS: CPT | Performed by: PHYSICAL THERAPIST

## 2018-01-18 PROCEDURE — 97035 APP MDLTY 1+ULTRASOUND EA 15: CPT | Performed by: PHYSICAL THERAPIST

## 2018-01-19 NOTE — PROGRESS NOTES
Physical Therapy Daily Progress Note    Subjective   Maryjane Gaspar reports that she has been wearing the static flexion straps frequently at home while sleeping and several times throughout the day. She has noticed that it has been easier to bend the fingers.     Objective   See Exercise, Manual, and Modality Logs for complete treatment.       Assessment/Plan     Improved passive and active ROM of the fingers into flexion in the clinic today. Will continue therapy for joint mobilization and capsular stretching with continued use of the flexion straps at home.    Progress per Plan of Care           Manual Therapy:    28     mins  16743;  Therapeutic Exercise:         mins  15608;     Neuromuscular Edwin:        mins  86537;    Therapeutic Activity:          mins  23127;     Gait Training:           mins  46349;     Ultrasound:     16     mins  74441;    Electrical Stimulation:         mins  58588 ( );  Dry Needling          mins self-pay    Timed Treatment:   44   mins   Total Treatment:     65   mins    Brennen Espinal PT  Physical Therapist

## 2018-01-23 ENCOUNTER — TREATMENT (OUTPATIENT)
Dept: PHYSICAL THERAPY | Facility: CLINIC | Age: 73
End: 2018-01-23

## 2018-01-23 DIAGNOSIS — M79.642 PAIN OF LEFT HAND: Primary | ICD-10-CM

## 2018-01-23 DIAGNOSIS — M20.012 MALLET FINGER OF LEFT HAND: ICD-10-CM

## 2018-01-23 PROCEDURE — 97110 THERAPEUTIC EXERCISES: CPT | Performed by: PHYSICAL THERAPIST

## 2018-01-23 PROCEDURE — 97140 MANUAL THERAPY 1/> REGIONS: CPT | Performed by: PHYSICAL THERAPIST

## 2018-01-23 PROCEDURE — 97022 WHIRLPOOL THERAPY: CPT | Performed by: PHYSICAL THERAPIST

## 2018-01-25 ENCOUNTER — TREATMENT (OUTPATIENT)
Dept: PHYSICAL THERAPY | Facility: CLINIC | Age: 73
End: 2018-01-25

## 2018-01-25 DIAGNOSIS — M20.012 MALLET FINGER OF LEFT HAND: ICD-10-CM

## 2018-01-25 DIAGNOSIS — M79.642 PAIN OF LEFT HAND: Primary | ICD-10-CM

## 2018-01-25 PROCEDURE — 97110 THERAPEUTIC EXERCISES: CPT | Performed by: PHYSICAL THERAPIST

## 2018-01-25 PROCEDURE — 97140 MANUAL THERAPY 1/> REGIONS: CPT | Performed by: PHYSICAL THERAPIST

## 2018-01-25 PROCEDURE — 97035 APP MDLTY 1+ULTRASOUND EA 15: CPT | Performed by: PHYSICAL THERAPIST

## 2018-01-25 NOTE — PROGRESS NOTES
Physical Therapy Daily Progress Note    Subjective   Maryjane Gaspar reports that she feels like the straps are improving her finger flexion. She is going to start using the strap on the small finger more.     Objective   See Exercise, Manual, and Modality Logs for complete treatment.       Assessment/Plan     Pt is making steady progress and shows an improvement in her finger AROM at all joints.     Progress per Plan of Care           Manual Therapy:    26     mins  76375;  Therapeutic Exercise:    20     mins  61226;     Neuromuscular Edwin:        mins  46237;    Therapeutic Activity:          mins  87041;     Gait Training:           mins  67055;     Ultrasound:          mins  08274;    Electrical Stimulation:         mins  32096 ( );  Dry Needling          mins self-pay    Timed Treatment:   46   mins   Total Treatment:     65   mins    Brennen Espinal PT  Physical Therapist

## 2018-01-26 NOTE — PROGRESS NOTES
Physical Therapy Daily Progress Note    Subjective   Maryjane Gaspar reports that he straps are really helping to get her fingers to bend more. She is able to wear the longer without pain as well.     Objective   See Exercise, Manual, and Modality Logs for complete treatment.       Assessment/Plan     Pt is making steady progress with therapy and she is able to tolerate more strengthening activity without pain or excessive fatigue in the clinic.     Progress per Plan of Care           Manual Therapy:    28     mins  15733;  Therapeutic Exercise:    20     mins  96242;     Neuromuscular Edwin:        mins  66249;    Therapeutic Activity:          mins  64015;     Gait Training:           mins  28719;     Ultrasound:          mins  81398;    Electrical Stimulation:         mins  82393 ( );  Dry Needling          mins self-pay    Timed Treatment:   48   mins   Total Treatment:     68   mins    Brennen Espinal, PT  Physical Therapist

## 2018-01-30 ENCOUNTER — TREATMENT (OUTPATIENT)
Dept: PHYSICAL THERAPY | Facility: CLINIC | Age: 73
End: 2018-01-30

## 2018-01-30 DIAGNOSIS — M20.012 MALLET FINGER OF LEFT HAND: ICD-10-CM

## 2018-01-30 DIAGNOSIS — M79.642 PAIN OF LEFT HAND: Primary | ICD-10-CM

## 2018-01-30 PROCEDURE — 97140 MANUAL THERAPY 1/> REGIONS: CPT | Performed by: PHYSICAL THERAPIST

## 2018-01-30 PROCEDURE — 97110 THERAPEUTIC EXERCISES: CPT | Performed by: PHYSICAL THERAPIST

## 2018-01-30 PROCEDURE — 97022 WHIRLPOOL THERAPY: CPT | Performed by: PHYSICAL THERAPIST

## 2018-01-31 NOTE — PROGRESS NOTES
Physical Therapy Daily Progress Note    Subjective   Maryjane Gaspar reports that she is feeling better, she gets improvement in her ROM with using the straps but she is still somewhat limited in her ability to flex the fingers actively, darlene at the DIP Joints     Objective   See Exercise, Manual, and Modality Logs for complete treatment.       Assessment/Plan     Pt seems to be making incremental progress with therapy. Will continue to treat up until her next MD visit in ~2 weeks.     Progress per Plan of Care           Manual Therapy:    32     mins  14314;  Therapeutic Exercise:    20     mins  22735;     Neuromuscular Edwin:        mins  36679;    Therapeutic Activity:          mins  05573;     Gait Training:           mins  70372;     Ultrasound:          mins  86141;    Electrical Stimulation:         mins  55063 ( );  Dry Needling          mins self-pay    Timed Treatment:   52   mins   Total Treatment:     70   mins    Brennen Espinal PT  Physical Therapist

## 2018-02-01 ENCOUNTER — TREATMENT (OUTPATIENT)
Dept: PHYSICAL THERAPY | Facility: CLINIC | Age: 73
End: 2018-02-01

## 2018-02-01 DIAGNOSIS — M79.642 PAIN OF LEFT HAND: Primary | ICD-10-CM

## 2018-02-01 DIAGNOSIS — M20.012 MALLET FINGER OF LEFT HAND: ICD-10-CM

## 2018-02-01 PROCEDURE — 97140 MANUAL THERAPY 1/> REGIONS: CPT | Performed by: PHYSICAL THERAPIST

## 2018-02-01 PROCEDURE — 97018 PARAFFIN BATH THERAPY: CPT | Performed by: PHYSICAL THERAPIST

## 2018-02-02 NOTE — PROGRESS NOTES
Physical Therapy Daily Progress Note    Subjective   Maryjane Gaspar reports that she is feeling more swollen today. She has had swelling in the hand, darlene the middle finger, for the past few days     Objective   See Exercise, Manual, and Modality Logs for complete treatment.       Assessment/Plan     Focused on mobilizations and ROM in the clinic today, held exercises due to swelling in the finger. Parafin was done with flexion straps on and with wrapping of the fingers. Ice was applied at the end of treatment.     Progress per Plan of Care           Manual Therapy:    35     mins  53789;  Therapeutic Exercise:         mins  17481;     Neuromuscular Edwin:        mins  01464;    Therapeutic Activity:          mins  45081;     Gait Training:           mins  00373;     Ultrasound:          mins  61385;    Electrical Stimulation:         mins  10244 ( );  Dry Needling          mins self-pay    Timed Treatment:   35   mins   Total Treatment:     62   mins    Brennen Espinal PT  Physical Therapist

## 2018-02-06 ENCOUNTER — TREATMENT (OUTPATIENT)
Dept: PHYSICAL THERAPY | Facility: CLINIC | Age: 73
End: 2018-02-06

## 2018-02-06 DIAGNOSIS — M79.642 PAIN OF LEFT HAND: Primary | ICD-10-CM

## 2018-02-06 DIAGNOSIS — M20.012 MALLET FINGER OF LEFT HAND: ICD-10-CM

## 2018-02-06 PROCEDURE — 97140 MANUAL THERAPY 1/> REGIONS: CPT | Performed by: PHYSICAL THERAPIST

## 2018-02-06 PROCEDURE — 97022 WHIRLPOOL THERAPY: CPT | Performed by: PHYSICAL THERAPIST

## 2018-02-08 ENCOUNTER — TREATMENT (OUTPATIENT)
Dept: PHYSICAL THERAPY | Facility: CLINIC | Age: 73
End: 2018-02-08

## 2018-02-08 DIAGNOSIS — M79.642 PAIN OF LEFT HAND: Primary | ICD-10-CM

## 2018-02-08 DIAGNOSIS — M20.012 MALLET FINGER OF LEFT HAND: ICD-10-CM

## 2018-02-08 PROCEDURE — 97140 MANUAL THERAPY 1/> REGIONS: CPT | Performed by: PHYSICAL THERAPIST

## 2018-02-08 PROCEDURE — 97022 WHIRLPOOL THERAPY: CPT | Performed by: PHYSICAL THERAPIST

## 2018-02-08 NOTE — PROGRESS NOTES
Physical Therapy Daily Progress Note    Subjective   Maryjane Gaspar reports that she is feeling about the same. Her fingers continue to be stiff and she has to wrap them for a while before she can put on the straps     Objective   See Exercise, Manual, and Modality Logs for complete treatment.       Assessment/Plan     Attempted IASTM today for possible adhesions of the extensor tendons causing difficulty with active flexion of the fingers.     Progress per Plan of Care           Manual Therapy:    40     mins  89041;  Therapeutic Exercise:         mins  12745;     Neuromuscular Edwin:        mins  25759;    Therapeutic Activity:          mins  88413;     Gait Training:           mins  18663;     Ultrasound:          mins  03417;    Electrical Stimulation:         mins  37876 ( );  Dry Needling          mins self-pay    Timed Treatment:   40   mins   Total Treatment:     60   mins    Brennen Espinal PT  Physical Therapist

## 2018-02-12 NOTE — PROGRESS NOTES
Physical Therapy Daily Progress Note    Subjective   Maryjane Gaspar reports that she feels like her fingers are moving a little better since her last visit.     Objective   See Exercise, Manual, and Modality Logs for complete treatment.     See scanned MD note in the chart     Assessment/Plan     Attempted dry needling today to address possible adhesions along the dorsum of the fingers. Pt tolerated treatment fair today and reported some pain with needling. Will assess her overall response at the next visit.     Progress per Plan of Care           Manual Therapy:    42     mins  73197;  Therapeutic Exercise:         mins  94634;     Neuromuscular Edwin:        mins  11919;    Therapeutic Activity:          mins  19644;     Gait Training:           mins  89173;     Ultrasound:          mins  20730;    Electrical Stimulation:         mins  54064 ( );  Dry Needling          mins self-pay    Timed Treatment:   42   mins   Total Treatment:     75   mins    Brennen Espinal PT  Physical Therapist

## 2018-02-13 ENCOUNTER — TREATMENT (OUTPATIENT)
Dept: PHYSICAL THERAPY | Facility: CLINIC | Age: 73
End: 2018-02-13

## 2018-02-13 DIAGNOSIS — M20.012 MALLET FINGER OF LEFT HAND: ICD-10-CM

## 2018-02-13 DIAGNOSIS — M79.642 PAIN OF LEFT HAND: Primary | ICD-10-CM

## 2018-02-13 PROCEDURE — G0283 ELEC STIM OTHER THAN WOUND: HCPCS | Performed by: PHYSICAL THERAPIST

## 2018-02-13 PROCEDURE — 97022 WHIRLPOOL THERAPY: CPT | Performed by: PHYSICAL THERAPIST

## 2018-02-13 PROCEDURE — 97140 MANUAL THERAPY 1/> REGIONS: CPT | Performed by: PHYSICAL THERAPIST

## 2018-02-16 NOTE — PROGRESS NOTES
Physical Therapy Daily Progress Note    Subjective   Maryjane Gaspar reports that she is feeling about the same. She had some increased pain after dry needling but has not really noticed much difference in her motion.     Objective   See Exercise, Manual, and Modality Logs for complete treatment.       Assessment/Plan     Dry needling was performed again today. Will assess her response at the next visit and progress as indicated.     Progress per Plan of Care           Manual Therapy:    35     mins  49878;  Therapeutic Exercise:         mins  72185;     Neuromuscular Edwin:        mins  26856;    Therapeutic Activity:          mins  58766;     Gait Training:           mins  69431;     Ultrasound:          mins  96254;    Electrical Stimulation:    20     mins  82556 ( );  Dry Needling          mins self-pay    Timed Treatment:   35   mins   Total Treatment:     75   mins    Brennen Espinal PT  Physical Therapist

## 2018-02-19 ENCOUNTER — TREATMENT (OUTPATIENT)
Dept: PHYSICAL THERAPY | Facility: CLINIC | Age: 73
End: 2018-02-19

## 2018-02-19 DIAGNOSIS — M79.642 PAIN OF LEFT HAND: Primary | ICD-10-CM

## 2018-02-19 DIAGNOSIS — M20.012 MALLET FINGER OF LEFT HAND: ICD-10-CM

## 2018-02-19 PROCEDURE — 97022 WHIRLPOOL THERAPY: CPT | Performed by: PHYSICAL THERAPIST

## 2018-02-19 PROCEDURE — G0283 ELEC STIM OTHER THAN WOUND: HCPCS | Performed by: PHYSICAL THERAPIST

## 2018-02-19 PROCEDURE — 97140 MANUAL THERAPY 1/> REGIONS: CPT | Performed by: PHYSICAL THERAPIST

## 2018-02-20 NOTE — PROGRESS NOTES
Physical Therapy Daily Progress Note    Subjective   Maryjane Gaspar reports that she is feeling about the same, she has trouble getting her fingers to flex without wrapping them for an extended period of time in a flexed position.     Objective   See Exercise, Manual, and Modality Logs for complete treatment.       Assessment/Plan     Pt is going back to [facility] for the next month. She has been instructed on a HEP and stretching protocol. Will assess when she comes back and determine the best course of treatment going forward.     Progress per Plan of Care           Manual Therapy:    32     mins  53317;  Therapeutic Exercise:         mins  19702;     Neuromuscular Edwin:        mins  98513;    Therapeutic Activity:          mins  72399;     Gait Training:           mins  45549;     Ultrasound:          mins  68156;    Electrical Stimulation:    20     mins  78775 ( );  Dry Needling          mins self-pay    Timed Treatment:   32   mins   Total Treatment:     70   mins    Brennen Espinal, PT  Physical Therapist

## 2018-06-04 ENCOUNTER — TELEPHONE (OUTPATIENT)
Dept: INTERNAL MEDICINE | Facility: CLINIC | Age: 73
End: 2018-06-04

## 2018-06-04 ENCOUNTER — OFFICE VISIT (OUTPATIENT)
Dept: INTERNAL MEDICINE | Facility: CLINIC | Age: 73
End: 2018-06-04

## 2018-06-04 VITALS
HEART RATE: 86 BPM | OXYGEN SATURATION: 98 % | WEIGHT: 219.8 LBS | HEIGHT: 68 IN | DIASTOLIC BLOOD PRESSURE: 82 MMHG | BODY MASS INDEX: 33.31 KG/M2 | SYSTOLIC BLOOD PRESSURE: 160 MMHG

## 2018-06-04 DIAGNOSIS — I10 HYPERTENSION, UNSPECIFIED TYPE: ICD-10-CM

## 2018-06-04 DIAGNOSIS — T14.8XXA MUSCLE STRAIN: Primary | ICD-10-CM

## 2018-06-04 PROCEDURE — 99213 OFFICE O/P EST LOW 20 MIN: CPT | Performed by: NURSE PRACTITIONER

## 2018-06-04 RX ORDER — PREDNISONE 20 MG/1
20 TABLET ORAL 2 TIMES DAILY
Qty: 10 TABLET | Refills: 9 | Status: SHIPPED | OUTPATIENT
Start: 2018-06-04 | End: 2018-07-05

## 2018-06-04 NOTE — TELEPHONE ENCOUNTER
PT CALLED IN TO LEAVE A MESSAGE FOR NEHA WOODS OR WESLEY GLORIA. THE PT STATES SHE HAS BEEN EXPERIENCING A ACHE IN HER RIGHT ARM FROM THE SHOULDER TO THE ELBOW. THE PT STATES SHE HAS NOT FALLEN OR ANYTHING BUT WANTS TO MAKE SURE IT IS NOTHING SERIOUS SINCE THE PAIN IS RADIATING DOWN THE ARM AND HAS BEEN GOING ON FOR A WEEK NOW. BEST CALL BACK # [phone]

## 2018-06-04 NOTE — PATIENT INSTRUCTIONS
Monitor BP twice a daily twice a week and  Bring results to your next appt  May use Tylenol for pain      Muscle Strain  A muscle strain (pulled muscle) happens when a muscle is stretched beyond normal length. It happens when a sudden, violent force stretches your muscle too far. Usually, a few of the fibers in your muscle are torn. Muscle strain is common in athletes. Recovery usually takes 1-2 weeks. Complete healing takes 5-6 weeks.  Follow these instructions at home:  · Follow the PRICE method of treatment to help your injury get better. Do this the first 2-3 days after the injury:  ¨ Protect. Protect the muscle to keep it from getting injured again.  ¨ Rest. Limit your activity and rest the injured body part.  ¨ Ice. Put ice in a plastic bag. Place a towel between your skin and the bag. Then, apply the ice and leave it on from 15-20 minutes each hour. After the third day, switch to moist heat packs.  ¨ Compression. Use a splint or elastic bandage on the injured area for comfort. Do not put it on too tightly.  ¨ Elevate. Keep the injured body part above the level of your heart.  · Only take medicine as told by your doctor.  · Warm up before doing exercise to prevent future muscle strains.  Contact a doctor if:  · You have more pain or puffiness (swelling) in the injured area.  · You feel numbness, tingling, or notice a loss of strength in the injured area.  This information is not intended to replace advice given to you by your health care provider. Make sure you discuss any questions you have with your health care provider.  Document Released: 09/26/2009 Document Revised: 05/25/2017 Document Reviewed: 07/17/2014  [employer] Interactive Patient Education © 2017 [employer]    Hypertension  Hypertension is another name for high blood pressure. High blood pressure forces your heart to work harder to pump blood. This can cause problems over time.  There are two numbers in a blood pressure reading. There is a top  number (systolic) over a bottom number (diastolic). It is best to have a blood pressure below 120/80. Healthy choices can help lower your blood pressure. You may need medicine to help lower your blood pressure if:  · Your blood pressure cannot be lowered with healthy choices.  · Your blood pressure is higher than 130/80.  Follow these instructions at home:  Eating and drinking   · If directed, follow the DASH eating plan. This diet includes:  ¨ Filling half of your plate at each meal with fruits and vegetables.  ¨ Filling one quarter of your plate at each meal with whole grains. Whole grains include whole wheat pasta, brown rice, and whole grain bread.  ¨ Eating or drinking low-fat dairy products, such as skim milk or low-fat yogurt.  ¨ Filling one quarter of your plate at each meal with low-fat (lean) proteins. Low-fat proteins include fish, skinless chicken, eggs, beans, and tofu.  ¨ Avoiding fatty meat, cured and processed meat, or chicken with skin.  ¨ Avoiding premade or processed food.  · Eat less than 1,500 mg of salt (sodium) a day.  · Limit alcohol use to no more than 1 drink a day for nonpregnant women and 2 drinks a day for men. One drink equals 12 oz of beer, 5 oz of wine, or 1½ oz of hard liquor.  Lifestyle   · Work with your doctor to stay at a healthy weight or to lose weight. Ask your doctor what the best weight is for you.  · Get at least 30 minutes of exercise that causes your heart to beat faster (aerobic exercise) most days of the week. This may include walking, swimming, or biking.  · Get at least 30 minutes of exercise that strengthens your muscles (resistance exercise) at least 3 days a week. This may include lifting weights or pilates.  · Do not use any products that contain nicotine or tobacco. This includes cigarettes and e-cigarettes. If you need help quitting, ask your doctor.  · Check your blood pressure at home as told by your doctor.  · Keep all follow-up visits as told by your doctor.  This is important.  Medicines   · Take over-the-counter and prescription medicines only as told by your doctor. Follow directions carefully.  · Do not skip doses of blood pressure medicine. The medicine does not work as well if you skip doses. Skipping doses also puts you at risk for problems.  · Ask your doctor about side effects or reactions to medicines that you should watch for.  Contact a doctor if:  · You think you are having a reaction to the medicine you are taking.  · You have headaches that keep coming back (recurring).  · You feel dizzy.  · You have swelling in your ankles.  · You have trouble with your vision.  Get help right away if:  · You get a very bad headache.  · You start to feel confused.  · You feel weak or numb.  · You feel faint.  · You get very bad pain in your:  ¨ Chest.  ¨ Belly (abdomen).  · You throw up (vomit) more than once.  · You have trouble breathing.  Summary  · Hypertension is another name for high blood pressure.  · Making healthy choices can help lower blood pressure. If your blood pressure cannot be controlled with healthy choices, you may need to take medicine.  This information is not intended to replace advice given to you by your health care provider. Make sure you discuss any questions you have with your health care provider.  Document Released: 06/05/2009 Document Revised: 11/15/2017 Document Reviewed: 11/15/2017  [employer] Interactive Patient Education © 2017 [employer]

## 2018-06-04 NOTE — PROGRESS NOTES
"Subjective   Maryjane Gaspar is a 73 y.o. female.   Chief Complaint   Patient presents with   • Arm Pain     C/o right arm pain and radiates to right shoulder. Worsens when raising arm over head x 3 weeks but getting worse everyday      History of Present Illness   Having right arm pain.  Denies Trauma.  Points to the right shoulder to elbow source of pain.  Not taking anything for the discomfort.  Hurts to internally and externally rotate the shoulder.  No shortness of air or chest pain.  No numbness or tingling in fingers.    The following portions of the patient's history were reviewed and updated as appropriate: allergies, current medications, past family history, past medical history, past social history, past surgical history and problem list.    Current Outpatient Prescriptions:   •  amLODIPine (NORVASC) 5 MG tablet, Take 1 tablet by mouth Daily., Disp: 90 tablet, Rfl: 3  •  aspirin 81 MG tablet, Take  by mouth., Disp: , Rfl:   •  levocetirizine (XYZAL) 5 MG tablet, Take 1 tablet by mouth Daily., Disp: 90 tablet, Rfl: 3  •  Multiple Vitamin (MULTIVITAMINS PO), Take 1 tablet by mouth daily., Disp: , Rfl:   •  predniSONE (DELTASONE) 20 MG tablet, Take 1 tablet by mouth 2 (Two) Times a Day., Disp: 10 tablet, Rfl: 9    Review of Systems:Consitutional, HEENT, Respiratory, CV, GI, , Skin, Musculoskeletal, Neuro-mental, Endocrinological, Hematological were reviewed.  Positives were discussed in the HPI, otherwise ROS was negative   /82   Pulse 86   Ht 172.7 cm (68\")   Wt 99.7 kg (219 lb 12.8 oz)   SpO2 98%   BMI 33.42 kg/m²     Objective   No Known Allergies    Physical Exam   Constitutional: She appears well-developed and well-nourished. No distress.   HENT:   Head: Normocephalic.   Neck: Neck supple.   Nontender, no deformity   Cardiovascular: Normal rate, regular rhythm, normal heart sounds and intact distal pulses.  Exam reveals no gallop and no friction rub.    No murmur heard.  Pulmonary/Chest: " Effort normal and breath sounds normal. No respiratory distress. She has no wheezes. She has no rales. She exhibits no tenderness.   Musculoskeletal:   Had to shoulderRight shoulder and humeral area is without deformity.  Has full range of motion of shoulder except that it hurts to abduct and abduct shoulder.  Denies pain with palpation.  Has strong radial and ulnar pulses.  Fingers pink and warm.   Skin: Skin is warm and dry. Capillary refill takes less than 2 seconds.   Nursing note and vitals reviewed.  :    Procedures    Assessment/Plan   Maryjane was seen today for arm pain.    Diagnoses and all orders for this visit:    Muscle strain  -     predniSONE (DELTASONE) 20 MG tablet; Take 1 tablet by mouth 2 (Two) Times a Day.    Hypertension, unspecified type            Patient Instructions   Monitor BP twice a daily twice a week and  Bring results to your next appt  May use Tylenol for pain      Muscle Strain  A muscle strain (pulled muscle) happens when a muscle is stretched beyond normal length. It happens when a sudden, violent force stretches your muscle too far. Usually, a few of the fibers in your muscle are torn. Muscle strain is common in athletes. Recovery usually takes 1-2 weeks. Complete healing takes 5-6 weeks.  Follow these instructions at home:  · Follow the PRICE method of treatment to help your injury get better. Do this the first 2-3 days after the injury:  ¨ Protect. Protect the muscle to keep it from getting injured again.  ¨ Rest. Limit your activity and rest the injured body part.  ¨ Ice. Put ice in a plastic bag. Place a towel between your skin and the bag. Then, apply the ice and leave it on from 15-20 minutes each hour. After the third day, switch to moist heat packs.  ¨ Compression. Use a splint or elastic bandage on the injured area for comfort. Do not put it on too tightly.  ¨ Elevate. Keep the injured body part above the level of your heart.  · Only take medicine as told by your  doctor.  · Warm up before doing exercise to prevent future muscle strains.  Contact a doctor if:  · You have more pain or puffiness (swelling) in the injured area.  · You feel numbness, tingling, or notice a loss of strength in the injured area.  This information is not intended to replace advice given to you by your health care provider. Make sure you discuss any questions you have with your health care provider.  Document Released: 09/26/2009 Document Revised: 05/25/2017 Document Reviewed: 07/17/2014  [employer] Interactive Patient Education © 2017 [employer]    Hypertension  Hypertension is another name for high blood pressure. High blood pressure forces your heart to work harder to pump blood. This can cause problems over time.  There are two numbers in a blood pressure reading. There is a top number (systolic) over a bottom number (diastolic). It is best to have a blood pressure below 120/80. Healthy choices can help lower your blood pressure. You may need medicine to help lower your blood pressure if:  · Your blood pressure cannot be lowered with healthy choices.  · Your blood pressure is higher than 130/80.  Follow these instructions at home:  Eating and drinking   · If directed, follow the DASH eating plan. This diet includes:  ¨ Filling half of your plate at each meal with fruits and vegetables.  ¨ Filling one quarter of your plate at each meal with whole grains. Whole grains include whole wheat pasta, brown rice, and whole grain bread.  ¨ Eating or drinking low-fat dairy products, such as skim milk or low-fat yogurt.  ¨ Filling one quarter of your plate at each meal with low-fat (lean) proteins. Low-fat proteins include fish, skinless chicken, eggs, beans, and tofu.  ¨ Avoiding fatty meat, cured and processed meat, or chicken with skin.  ¨ Avoiding premade or processed food.  · Eat less than 1,500 mg of salt (sodium) a day.  · Limit alcohol use to no more than 1 drink a day for nonpregnant women and 2  drinks a day for men. One drink equals 12 oz of beer, 5 oz of wine, or 1½ oz of hard liquor.  Lifestyle   · Work with your doctor to stay at a healthy weight or to lose weight. Ask your doctor what the best weight is for you.  · Get at least 30 minutes of exercise that causes your heart to beat faster (aerobic exercise) most days of the week. This may include walking, swimming, or biking.  · Get at least 30 minutes of exercise that strengthens your muscles (resistance exercise) at least 3 days a week. This may include lifting weights or pilates.  · Do not use any products that contain nicotine or tobacco. This includes cigarettes and e-cigarettes. If you need help quitting, ask your doctor.  · Check your blood pressure at home as told by your doctor.  · Keep all follow-up visits as told by your doctor. This is important.  Medicines   · Take over-the-counter and prescription medicines only as told by your doctor. Follow directions carefully.  · Do not skip doses of blood pressure medicine. The medicine does not work as well if you skip doses. Skipping doses also puts you at risk for problems.  · Ask your doctor about side effects or reactions to medicines that you should watch for.  Contact a doctor if:  · You think you are having a reaction to the medicine you are taking.  · You have headaches that keep coming back (recurring).  · You feel dizzy.  · You have swelling in your ankles.  · You have trouble with your vision.  Get help right away if:  · You get a very bad headache.  · You start to feel confused.  · You feel weak or numb.  · You feel faint.  · You get very bad pain in your:  ¨ Chest.  ¨ Belly (abdomen).  · You throw up (vomit) more than once.  · You have trouble breathing.  Summary  · Hypertension is another name for high blood pressure.  · Making healthy choices can help lower blood pressure. If your blood pressure cannot be controlled with healthy choices, you may need to take medicine.  This information  is not intended to replace advice given to you by your health care provider. Make sure you discuss any questions you have with your health care provider.  Document Released: 06/05/2009 Document Revised: 11/15/2017 Document Reviewed: 11/15/2017  [employer] Interactive Patient Education © 2017 [employer]        Naa Vargas, APRN

## 2018-07-05 ENCOUNTER — OFFICE VISIT (OUTPATIENT)
Dept: INTERNAL MEDICINE | Facility: CLINIC | Age: 73
End: 2018-07-05

## 2018-07-05 VITALS
DIASTOLIC BLOOD PRESSURE: 84 MMHG | OXYGEN SATURATION: 99 % | SYSTOLIC BLOOD PRESSURE: 132 MMHG | WEIGHT: 217 LBS | HEART RATE: 77 BPM | HEIGHT: 68 IN | BODY MASS INDEX: 32.89 KG/M2

## 2018-07-05 DIAGNOSIS — Z12.39 ENCOUNTER FOR SCREENING FOR MALIGNANT NEOPLASM OF BREAST: Primary | ICD-10-CM

## 2018-07-05 DIAGNOSIS — M79.601 PAIN OF RIGHT UPPER EXTREMITY: ICD-10-CM

## 2018-07-05 DIAGNOSIS — I10 ESSENTIAL HYPERTENSION: ICD-10-CM

## 2018-07-05 PROCEDURE — 99213 OFFICE O/P EST LOW 20 MIN: CPT | Performed by: NURSE PRACTITIONER

## 2018-07-05 NOTE — PATIENT INSTRUCTIONS
I am pleased with BP trending down.  Physical therapy for arm pain.  Health maintenance:  order for [facility].  Will get vaccines (shingles, T-dap, Pneumo)  from her pharmacy

## 2018-07-05 NOTE — PROGRESS NOTES
"Subjective   Maryjane Gaspar is a 73 y.o. female.   Chief Complaint   Patient presents with   • Follow-up     needs mammorgram   • Hypertension   • Immunizations     pneuomococcal, tdap, shingles      History of Present Illness  Right arm is somewhat better.Still hurts-but not the sharp standing pain.  Worse at night-interrupts with sleep.  Still hurts to raise above head.  Denies HA, SOA, Dizziness, CP, abd pain, change in bowel or bladder habits.  Brings in record of BP readings-much improved     The following portions of the patient's history were reviewed and updated as appropriate: allergies, current medications, past family history, past medical history, past social history, past surgical history and problem list.    Current Outpatient Prescriptions:   •  amLODIPine (NORVASC) 5 MG tablet, Take 1 tablet by mouth Daily., Disp: 90 tablet, Rfl: 3  •  aspirin 81 MG tablet, Take  by mouth., Disp: , Rfl:   •  levocetirizine (XYZAL) 5 MG tablet, Take 1 tablet by mouth Daily., Disp: 90 tablet, Rfl: 3  •  Multiple Vitamin (MULTIVITAMINS PO), Take 1 tablet by mouth daily., Disp: , Rfl:     Review of Systems Consitutional, HEENT, Respiratory, CV, GI, , Skin, Musculoskeletal, Neuro-mental, Endocrinological, Hematological were reviewed.  Positives were discussed in the HPI, otherwise ROS was negative   /84   Pulse 77   Ht 172.7 cm (68\")   Wt 98.4 kg (217 lb)   SpO2 99%   BMI 32.99 kg/m²     Objective   No Known Allergies    Physical Exam   Constitutional: She appears well-developed and well-nourished.   HENT:   Head: Normocephalic.   Cardiovascular: Normal rate, regular rhythm, normal heart sounds and intact distal pulses.    Pulmonary/Chest: Effort normal and breath sounds normal. No respiratory distress.   Musculoskeletal:   Tender to right humeral area below shoulder level.  FROM right shoulder, finger pink, warm, strong radial and ulnar pulses    Skin: Skin is warm and dry. Capillary refill takes less than " 2 seconds.   Pink   Nursing note and vitals reviewed.      Procedures    Assessment/Plan   Maryjane was seen today for follow-up, hypertension and immunizations.    Diagnoses and all orders for this visit:    Encounter for screening for malignant neoplasm of breast  -     Mammo Screening Bilateral With CAD; Future    Pain of right upper extremity  -     Ambulatory Referral to Physical Therapy Evaluate and treat    Essential hypertension          Patient Instructions   I am pleased with BP trending down.  Physical therapy for arm pain.  Health maintenance:  order for [facility].  Will get vaccines (shingles, T-dap, Pneumo)  from her pharmacy      [facility]

## 2018-08-08 ENCOUNTER — HOSPITAL ENCOUNTER (OUTPATIENT)
Dept: MAMMOGRAPHY | Facility: HOSPITAL | Age: 73
Discharge: HOME | End: 2018-08-08
Admitting: NURSE PRACTITIONER

## 2018-08-08 DIAGNOSIS — Z12.39 ENCOUNTER FOR SCREENING FOR MALIGNANT NEOPLASM OF BREAST: ICD-10-CM

## 2018-08-08 PROCEDURE — 77067 SCR MAMMO BI INCL CAD: CPT

## 2018-08-08 PROCEDURE — 77063 BREAST TOMOSYNTHESIS BI: CPT

## 2018-08-08 PROCEDURE — 77063 BREAST TOMOSYNTHESIS BI: CPT | Performed by: RADIOLOGY

## 2018-08-08 PROCEDURE — 77067 SCR MAMMO BI INCL CAD: CPT | Performed by: RADIOLOGY

## 2018-08-09 NOTE — PROGRESS NOTES
Please call patient mammogram was read as negative-no findings suspicious for malignancy.  Please do monthly self breast exams.  If you find any concerning lesions please let us know Recommend annual mammogram.

## 2018-09-16 DIAGNOSIS — I10 ESSENTIAL HYPERTENSION: Chronic | ICD-10-CM

## 2018-09-16 RX ORDER — AMLODIPINE BESYLATE 5 MG/1
5 TABLET ORAL DAILY
Qty: 90 TABLET | Refills: 0 | Status: SHIPPED | OUTPATIENT
Start: 2018-09-16 | End: 2019-03-01 | Stop reason: SDUPTHER

## 2018-09-17 ENCOUNTER — OFFICE VISIT (OUTPATIENT)
Dept: INTERNAL MEDICINE | Facility: CLINIC | Age: 73
End: 2018-09-17

## 2018-09-17 VITALS
HEART RATE: 74 BPM | WEIGHT: 207.8 LBS | BODY MASS INDEX: 31.6 KG/M2 | SYSTOLIC BLOOD PRESSURE: 130 MMHG | DIASTOLIC BLOOD PRESSURE: 82 MMHG | OXYGEN SATURATION: 100 %

## 2018-09-17 DIAGNOSIS — I10 ESSENTIAL HYPERTENSION: Primary | ICD-10-CM

## 2018-09-17 DIAGNOSIS — Z13.820 SCREENING FOR OSTEOPOROSIS: ICD-10-CM

## 2018-09-17 DIAGNOSIS — J06.9 UPPER RESPIRATORY TRACT INFECTION, UNSPECIFIED TYPE: ICD-10-CM

## 2018-09-17 DIAGNOSIS — M17.11 PRIMARY OSTEOARTHRITIS OF RIGHT KNEE: ICD-10-CM

## 2018-09-17 LAB
ALBUMIN SERPL-MCNC: 4.46 G/DL (ref 3.2–4.8)
ALBUMIN/GLOB SERPL: 1.9 G/DL (ref 1.5–2.5)
ALP SERPL-CCNC: 105 U/L (ref 25–100)
ALT SERPL W P-5'-P-CCNC: 17 U/L (ref 7–40)
ANION GAP SERPL CALCULATED.3IONS-SCNC: 7 MMOL/L (ref 3–11)
ARTICHOKE IGE QN: 90 MG/DL (ref 0–130)
AST SERPL-CCNC: 21 U/L (ref 0–33)
BILIRUB SERPL-MCNC: 0.7 MG/DL (ref 0.3–1.2)
BUN BLD-MCNC: 9 MG/DL (ref 9–23)
BUN/CREAT SERPL: 9.9 (ref 7–25)
CALCIUM SPEC-SCNC: 9.9 MG/DL (ref 8.7–10.4)
CHLORIDE SERPL-SCNC: 105 MMOL/L (ref 99–109)
CHOLEST SERPL-MCNC: 162 MG/DL (ref 0–200)
CO2 SERPL-SCNC: 30 MMOL/L (ref 20–31)
CREAT BLD-MCNC: 0.91 MG/DL (ref 0.6–1.3)
GFR SERPL CREATININE-BSD FRML MDRD: 74 ML/MIN/1.73
GLOBULIN UR ELPH-MCNC: 2.3 GM/DL
GLUCOSE BLD-MCNC: 94 MG/DL (ref 70–100)
HDLC SERPL-MCNC: 64 MG/DL (ref 40–60)
POTASSIUM BLD-SCNC: 3.9 MMOL/L (ref 3.5–5.5)
PROT SERPL-MCNC: 6.8 G/DL (ref 5.7–8.2)
SODIUM BLD-SCNC: 142 MMOL/L (ref 132–146)
TRIGL SERPL-MCNC: 79 MG/DL (ref 0–150)

## 2018-09-17 PROCEDURE — G0439 PPPS, SUBSEQ VISIT: HCPCS | Performed by: NURSE PRACTITIONER

## 2018-09-17 PROCEDURE — 80053 COMPREHEN METABOLIC PANEL: CPT | Performed by: NURSE PRACTITIONER

## 2018-09-17 PROCEDURE — 80061 LIPID PANEL: CPT | Performed by: NURSE PRACTITIONER

## 2018-09-17 NOTE — PROGRESS NOTES
Subjective   Maryjane Gaspar is a 73 y.o. female.   Chief Complaint   Patient presents with   • Annual Exam     Physical    • Follow-up     Shoulder Pain    • Nasal Congestion     Patient states has a head cold runny nose       History of Present Illness     The following portions of the patient's history were reviewed and updated as appropriate: allergies, current medications, past family history, past medical history, past social history, past surgical history and problem list.    Current Outpatient Prescriptions:   •  amLODIPine (NORVASC) 5 MG tablet, TAKE 1 TABLET BY MOUTH  DAILY, Disp: 90 tablet, Rfl: 0  •  aspirin 81 MG tablet, Take  by mouth., Disp: , Rfl:   •  levocetirizine (XYZAL) 5 MG tablet, Take 1 tablet by mouth Daily., Disp: 90 tablet, Rfl: 3  •  Multiple Vitamin (MULTIVITAMINS PO), Take 1 tablet by mouth daily., Disp: , Rfl:     Review of Systems  /82   Pulse 74   Wt 94.3 kg (207 lb 12.8 oz)   SpO2 100%   BMI 31.60 kg/m²     Objective   No Known Allergies    Physical Exam    Procedures    Assessment/Plan   There are no diagnoses linked to this encounter.          Naa Vargas, VAL

## 2018-09-17 NOTE — PROGRESS NOTES
QUICK REFERENCE INFORMATION:  The ABCs of the Annual Wellness Visit    Subsequent Medicare Wellness Visit    HEALTH RISK ASSESSMENT    1945    Recent Hospitalizations:  No hospitalization(s) within the last year.. No          Current Medical Providers:  Patient Care Team:  Naa Vargas APRN as PCP - General (Nurse Practitioner)  Edson Phillips PA as PCP - Claims Attributed        Smoking Status:  History   Smoking Status   • Former Smoker   • Quit date: 1984   Smokeless Tobacco   • Former User       Alcohol Consumption:  History   Alcohol Use   • Yes     Comment: Red wine 3-4 times a month       Depression Screen:   PHQ-2/PHQ-9 Depression Screening 9/17/2018   Little interest or pleasure in doing things 0   Feeling down, depressed, or hopeless 0   Trouble falling or staying asleep, or sleeping too much -   Feeling tired or having little energy -   Poor appetite or overeating -   Feeling bad about yourself - or that you are a failure or have let yourself or your family down -   Trouble concentrating on things, such as reading the newspaper or watching television -   Moving or speaking so slowly that other people could have noticed. Or the opposite - being so fidgety or restless that you have been moving around a lot more than usual -   Thoughts that you would be better off dead, or of hurting yourself in some way -   Total Score 0   If you checked off any problems, how difficult have these problems made it for you to do your work, take care of things at home, or get along with other people? -       Health Habits and Functional and Cognitive Screening:  Functional & Cognitive Status 9/15/2017   Do you have difficulty preparing food and eating? No   Do you have difficulty bathing yourself, getting dressed or grooming yourself? No   Do you have difficulty using the toilet? No   Do you have difficulty moving around from place to place? No   In the past year have you fallen or experienced a near fall? No    Current Diet Well Balanced Diet   Dental Exam Up to date   Eye Exam Up to date   Exercise (times per week) 0 times per week   Current Exercise Activities Include None   Do you need help using the phone?  No   Are you deaf or do you have serious difficulty hearing?  No   Do you need help with transportation? No   Do you need help shopping? No   Do you need help preparing meals?  No   Do you need help with housework?  No   Do you need help with laundry? No   Do you need help taking your medications? No   Do you need help managing money? No   Do you have difficulty concentrating, remembering or making decisions? -           Does the patient have evidence of cognitive impairment?NO     Aspirin use counseling: Taking ASA appropriately as indicated      Recent Lab Results:  CMP:  Lab Results   Component Value Date    BUN 12 09/15/2017    CREATININE 0.90 09/15/2017    EGFRIFAFRI 75 09/15/2017    BCR 13.3 09/15/2017     09/15/2017    K 3.7 09/15/2017    CO2 27.0 09/15/2017    CALCIUM 9.8 09/15/2017    ALBUMIN 4.30 09/15/2017    BILITOT 0.7 09/15/2017    ALKPHOS 106 (H) 09/15/2017    AST 18 09/15/2017    ALT 15 09/15/2017     Lipid Panel:  Lab Results   Component Value Date    CHOL 159 09/06/2016    TRIG 89 09/06/2016    HDL 63 (H) 09/06/2016     HbA1c:       Visual Acuity:  Vision Screening Comments: Reading glasses and for night time    Age-appropriate Screening Schedule:  Refer to the list below for future screening recommendations based on patient's age, sex and/or medical conditions. Orders for these recommended tests are listed in the plan section. The patient has been provided with a written plan.    Health Maintenance   Topic Date Due   • INFLUENZA VACCINE  08/01/2018   • TDAP/TD VACCINES (1 - Tdap) 09/17/2018 (Originally 3/2/1964)   • ZOSTER VACCINE (1 of 2) 09/17/2018 (Originally 3/2/1996)   • PNEUMOCOCCAL VACCINES (65+ LOW/MEDIUM RISK) (2 of 2 - PPSV23) 07/05/2019   • MAMMOGRAM  08/08/2020   • COLONOSCOPY   07/12/2026        Subjective   History of Present Illness    Maryjane Gaspar is a 73 y.o. female who presents for an Subsequent Wellness Visit.    Has Nasal congestion.  Onset Thursday.  [place] like she was initially running a fever.  Has had interement HA.  Denies ear pain, sore throat.  Has occ sneezing.  No cough, CP, abd pain, NVD, blood in urine or stool.  Eating and drinking OK.   Has right shoulder pain-tried liniment.  Didn't FU with physical therapy.  No trauma.  Onset  6 months ago.  Pain has not changed.  No pap in 5 years.  UTD RE john       The following portions of the patient's history were reviewed and updated as appropriate: allergies, current medications, past family history, past medical history, past social history, past surgical history and problem list.    Outpatient Medications Prior to Visit   Medication Sig Dispense Refill   • amLODIPine (NORVASC) 5 MG tablet TAKE 1 TABLET BY MOUTH  DAILY 90 tablet 0   • aspirin 81 MG tablet Take  by mouth.     • levocetirizine (XYZAL) 5 MG tablet Take 1 tablet by mouth Daily. 90 tablet 3   • Multiple Vitamin (MULTIVITAMINS PO) Take 1 tablet by mouth daily.       No facility-administered medications prior to visit.        Patient Active Problem List   Diagnosis   • HTN (hypertension)   • Obesity (BMI 30.0-34.9)   • Umbilical hernia   • Osteoarthritis of knee   • Allergic rhinitis   • Lymphocytosis   • Hypercalcemia   • BPPV (benign paroxysmal positional vertigo)       Advance Care Planning:  has NO advance directive - information provided to the patient today    Identification of Risk Factors:  Risk factors include: weight .    Review of Systems    Compared to one year ago, the patient feels her physical health is the same.  Compared to one year ago, the patient feels her mental health is the same.    Objective     Physical Exam   Constitutional: She is oriented to person, place, and time. She appears well-developed and well-nourished. No distress.   HENT:    Head: Normocephalic.   Right Ear: External ear normal.   Left Ear: External ear normal.   Nose: Nose normal.   Mouth/Throat: Oropharynx is clear and moist.   TM dull.  Nontender over sinuses.     Eyes: Pupils are equal, round, and reactive to light. Right eye exhibits no discharge. Left eye exhibits no discharge. No scleral icterus.   Neck: Neck supple.   No carotid bruit   Cardiovascular: Normal rate, regular rhythm, normal heart sounds and intact distal pulses.  Exam reveals no gallop and no friction rub.    No murmur heard.  Pulmonary/Chest: Effort normal and breath sounds normal. No respiratory distress.   Abdominal: Soft. Bowel sounds are normal. She exhibits no distension.   Musculoskeletal:   REY well    Lymphadenopathy:     She has no cervical adenopathy.   Neurological: She is alert and oriented to person, place, and time.   Skin: Skin is warm and dry.   Is pink, no rash    Psychiatric: She has a normal mood and affect. Her behavior is normal. Judgment and thought content normal.   Nursing note and vitals reviewed.      Vitals:    09/17/18 0940   BP: 130/82   Pulse: 74   SpO2: 100%   Weight: 94.3 kg (207 lb 12.8 oz)       Patient's Body mass index is 31.6 kg/m². BMI is above normal parameters. Recommendations include: nutrition counseling.      Assessment/Plan   Patient Self-Management and Personalized Health Advice  The patient has been provided with information about: weight management and preventive services including:   · Advance directive.    Visit Diagnoses:    ICD-10-CM ICD-9-CM   1. Essential hypertension I10 401.9   2. Screening for osteoporosis Z13.820 V82.81   3. Primary osteoarthritis of right knee M17.11 715.16   4. Upper respiratory tract infection, unspecified type J06.9 465.9       Orders Placed This Encounter   Procedures   • Comprehensive metabolic panel   • Lipid panel       Outpatient Encounter Prescriptions as of 9/17/2018   Medication Sig Dispense Refill   • amLODIPine (NORVASC) 5 MG  tablet TAKE 1 TABLET BY MOUTH  DAILY 90 tablet 0   • aspirin 81 MG tablet Take  by mouth.     • levocetirizine (XYZAL) 5 MG tablet Take 1 tablet by mouth Daily. 90 tablet 3   • Multiple Vitamin (MULTIVITAMINS PO) Take 1 tablet by mouth daily.       No facility-administered encounter medications on file as of 9/17/2018.        Reviewed use of high risk medication in the elderly: yes  Reviewed for potential of harmful drug interactions in the elderly: yes    Follow Up:  Return in about 3 months (around 12/17/2018).     An After Visit Summary and PPPS with all of these plans were given to the patient.    Patient Instructions   Continue same meds and her med list.  CMP lipid panel.  Recommend Pap smear.  Use decongestant and antihistamine of choice for cold.  Drink plenty of fluids.  Health education: Heart healthy diet.  Daily exercise for at least 30 minutes.  Ensure that your immunizations are up-to-date.  Strongly encouraged flu vaccine and hepatitis A vaccine.  Recommend advanced directives.  Try to lose 5 pounds over the next 3 months.

## 2018-09-17 NOTE — PATIENT INSTRUCTIONS
Continue same meds and her med list.  CMP lipid panel.  Recommend Pap smear.  Use decongestant and antihistamine of choice for cold.  Drink plenty of fluids.  Health education: Heart healthy diet.  Daily exercise for at least 30 minutes.  Ensure that your immunizations are up-to-date.  Strongly encouraged flu vaccine and hepatitis A vaccine.  Recommend advanced directives.  Try to lose 5 pounds over the next 3 months.

## 2018-10-02 ENCOUNTER — FLU SHOT (OUTPATIENT)
Dept: INTERNAL MEDICINE | Facility: CLINIC | Age: 73
End: 2018-10-02

## 2018-10-02 DIAGNOSIS — Z23 INFLUENZA VACCINE NEEDED: ICD-10-CM

## 2018-10-02 PROCEDURE — G0008 ADMIN INFLUENZA VIRUS VAC: HCPCS | Performed by: NURSE PRACTITIONER

## 2018-10-02 PROCEDURE — 90662 IIV NO PRSV INCREASED AG IM: CPT | Performed by: NURSE PRACTITIONER

## 2018-12-10 ENCOUNTER — OFFICE VISIT (OUTPATIENT)
Dept: INTERNAL MEDICINE | Facility: CLINIC | Age: 73
End: 2018-12-10

## 2018-12-10 VITALS
HEART RATE: 80 BPM | TEMPERATURE: 97.8 F | HEIGHT: 68 IN | SYSTOLIC BLOOD PRESSURE: 130 MMHG | DIASTOLIC BLOOD PRESSURE: 90 MMHG | BODY MASS INDEX: 32.1 KG/M2 | WEIGHT: 211.8 LBS

## 2018-12-10 DIAGNOSIS — I10 ESSENTIAL HYPERTENSION: Primary | ICD-10-CM

## 2018-12-10 DIAGNOSIS — R74.8 ELEVATED ALKALINE PHOSPHATASE LEVEL: ICD-10-CM

## 2018-12-10 LAB
ALBUMIN SERPL-MCNC: 4.31 G/DL (ref 3.2–4.8)
ALBUMIN/GLOB SERPL: 1.9 G/DL (ref 1.5–2.5)
ALP SERPL-CCNC: 107 U/L (ref 25–100)
ALT SERPL W P-5'-P-CCNC: 16 U/L (ref 7–40)
ANION GAP SERPL CALCULATED.3IONS-SCNC: 9 MMOL/L (ref 3–11)
AST SERPL-CCNC: 23 U/L (ref 0–33)
BILIRUB SERPL-MCNC: 0.6 MG/DL (ref 0.3–1.2)
BUN BLD-MCNC: 12 MG/DL (ref 9–23)
BUN/CREAT SERPL: 13.5 (ref 7–25)
CALCIUM SPEC-SCNC: 9.7 MG/DL (ref 8.7–10.4)
CHLORIDE SERPL-SCNC: 102 MMOL/L (ref 99–109)
CO2 SERPL-SCNC: 29 MMOL/L (ref 20–31)
CREAT BLD-MCNC: 0.89 MG/DL (ref 0.6–1.3)
GFR SERPL CREATININE-BSD FRML MDRD: 76 ML/MIN/1.73
GLOBULIN UR ELPH-MCNC: 2.3 GM/DL
GLUCOSE BLD-MCNC: 84 MG/DL (ref 70–100)
POTASSIUM BLD-SCNC: 3.7 MMOL/L (ref 3.5–5.5)
PROT SERPL-MCNC: 6.6 G/DL (ref 5.7–8.2)
SODIUM BLD-SCNC: 140 MMOL/L (ref 132–146)

## 2018-12-10 PROCEDURE — 99214 OFFICE O/P EST MOD 30 MIN: CPT | Performed by: NURSE PRACTITIONER

## 2018-12-10 PROCEDURE — 80053 COMPREHEN METABOLIC PANEL: CPT | Performed by: NURSE PRACTITIONER

## 2018-12-10 RX ORDER — ZOSTER VACCINE RECOMBINANT, ADJUVANTED 50 MCG/0.5
KIT INTRAMUSCULAR
Refills: 0 | COMMUNITY
Start: 2018-11-08 | End: 2020-05-14

## 2018-12-10 NOTE — PATIENT INSTRUCTIONS
Monitor BP at least once a month.  Our goal is for your BP to be less than 130/80.  Avoid salt. Cont same meds.  CMP today.  Have fun in [place].  Pt verbalizes understanding and agreement with plan of care.

## 2018-12-10 NOTE — PROGRESS NOTES
"Subjective   Maryjane Gaspar is a 73 y.o. female.   Chief Complaint   Patient presents with   • Follow-up     and fasting blood work   • Abnormal Lab   • Hypertension      History of Present Illness patient reports since her last visit she has had her influenza vaccine.  She is here today to follow-up her abnormal alkaline phosphatase and high blood pressure.  Pt reports she does not monitor BP at home.  Tries to stick to low fat low cholesterol diet.  Takes BP meds on regular basis.  Patient denies fever chills, headache, ear pain, sore throat, shortness of air, cough, chest pain, abdominal pain, nausea, vomiting, diarrhea, dysuria, blood in stool or urine. Mood is good.  Eating and drinking as usual.  No unexplained weight loss or gain.      The following portions of the patient's history were reviewed and updated as appropriate: allergies, current medications, past family history, past medical history, past social history, past surgical history and problem list.    Current Outpatient Medications:   •  amLODIPine (NORVASC) 5 MG tablet, TAKE 1 TABLET BY MOUTH  DAILY, Disp: 90 tablet, Rfl: 0  •  aspirin 81 MG tablet, Take  by mouth., Disp: , Rfl:   •  levocetirizine (XYZAL) 5 MG tablet, Take 1 tablet by mouth Daily., Disp: 90 tablet, Rfl: 3  •  Multiple Vitamin (MULTIVITAMINS PO), Take 1 tablet by mouth daily., Disp: , Rfl:   •  SHINGRIX 50 MCG/0.5ML reconstituted suspension, ADM 0.5ML IM UTD, Disp: , Rfl: 0    Review of Systems  Consitutional, HEENT, Respiratory, CV, GI, , Skin, Musculoskeletal, Neuro-mental, Endocrinological, Hematological were reviewed.  Positives were discussed in the HPI, otherwise ROS was negative   /90   Pulse 80   Temp 97.8 °F (36.6 °C)   Ht 172.7 cm (68\")   Wt 96.1 kg (211 lb 12.8 oz)   BMI 32.20 kg/m²     Objective   No Known Allergies    Physical Exam   Constitutional: She is oriented to person, place, and time. She appears well-developed and well-nourished. No distress. "   HENT:   Head: Normocephalic.   Eyes: Right eye exhibits no discharge. Left eye exhibits no discharge. No scleral icterus.   Neck: Neck supple. No thyromegaly present.   Cardiovascular: Normal rate, regular rhythm, normal heart sounds and intact distal pulses. Exam reveals no gallop and no friction rub.   No murmur heard.  Pulmonary/Chest: Effort normal and breath sounds normal.   Abdominal: Soft. She exhibits no mass. There is no tenderness.   Lymphadenopathy:     She has no cervical adenopathy.   Neurological: She is alert and oriented to person, place, and time.   Skin: Skin is warm and dry. Capillary refill takes less than 2 seconds.   Is pink, no rash    Nursing note and vitals reviewed.      Procedures    LABS  Results for orders placed or performed in visit on 09/17/18   Comprehensive metabolic panel   Result Value Ref Range    Glucose 94 70 - 100 mg/dL    BUN 9 9 - 23 mg/dL    Creatinine 0.91 0.60 - 1.30 mg/dL    Sodium 142 132 - 146 mmol/L    Potassium 3.9 3.5 - 5.5 mmol/L    Chloride 105 99 - 109 mmol/L    CO2 30.0 20.0 - 31.0 mmol/L    Calcium 9.9 8.7 - 10.4 mg/dL    Total Protein 6.8 5.7 - 8.2 g/dL    Albumin 4.46 3.20 - 4.80 g/dL    ALT (SGPT) 17 7 - 40 U/L    AST (SGOT) 21 0 - 33 U/L    Alkaline Phosphatase 105 (H) 25 - 100 U/L    Total Bilirubin 0.7 0.3 - 1.2 mg/dL    eGFR  African Amer 74 >60 mL/min/1.73    Globulin 2.3 gm/dL    A/G Ratio 1.9 1.5 - 2.5 g/dL    BUN/Creatinine Ratio 9.9 7.0 - 25.0    Anion Gap 7.0 3.0 - 11.0 mmol/L   Lipid panel   Result Value Ref Range    Total Cholesterol 162 0 - 200 mg/dL    Triglycerides 79 0 - 150 mg/dL    HDL Cholesterol 64 (H) 40 - 60 mg/dL    LDL Cholesterol  90 0 - 130 mg/dL       Assessment/Plan   Maryjane was seen today for follow-up, abnormal lab and hypertension.    Diagnoses and all orders for this visit:    Essential hypertension  -     Comprehensive Metabolic Panel    Elevated alkaline phosphatase level      Patient Instructions   Monitor BP at least  once a month.  Our goal is for your BP to be less than 130/80.  Avoid salt. Cont same meds.  CMP today.  Have fun in [place].  Pt verbalizes understanding and agreement with plan of care.   EMR Dragon/transcription disclaimer:  Please note that portions of this note were completed with a voice recognition program.  Electronic transcription of the voice recognition program may permit erroneous words or phrases to be inadvertently transcribed.  Although I have reviewed the note for such errors, some may still exist in this documentation         Naa Vargas APRN

## 2018-12-31 DIAGNOSIS — I10 ESSENTIAL HYPERTENSION: Chronic | ICD-10-CM

## 2018-12-31 RX ORDER — AMLODIPINE BESYLATE 5 MG/1
5 TABLET ORAL DAILY
Qty: 90 TABLET | Refills: 0 | OUTPATIENT
Start: 2018-12-31

## 2019-02-08 ENCOUNTER — TELEPHONE (OUTPATIENT)
Dept: INTERNAL MEDICINE | Facility: CLINIC | Age: 74
End: 2019-02-08

## 2019-02-08 NOTE — TELEPHONE ENCOUNTER
Please advise,   Pt called and stated she went to the ER in [place] on Friday and she states she is no better   She states she  Is having hear loss and coughing up mucus that is red.   She is wanting you to know and call her back with what she should do?

## 2019-02-26 DIAGNOSIS — I10 ESSENTIAL HYPERTENSION: Chronic | ICD-10-CM

## 2019-02-26 RX ORDER — AMLODIPINE BESYLATE 5 MG/1
5 TABLET ORAL DAILY
Qty: 90 TABLET | Refills: 0 | OUTPATIENT
Start: 2019-02-26

## 2019-03-01 DIAGNOSIS — I10 ESSENTIAL HYPERTENSION: Chronic | ICD-10-CM

## 2019-03-01 RX ORDER — AMLODIPINE BESYLATE 5 MG/1
5 TABLET ORAL DAILY
Qty: 90 TABLET | Refills: 0 | Status: SHIPPED | OUTPATIENT
Start: 2019-03-01 | End: 2019-04-25 | Stop reason: SDUPTHER

## 2019-03-01 NOTE — TELEPHONE ENCOUNTER
Patient called and requested a refill on Amlodipine 5mg.  Pharmacy is [facility].  She states they faxed a request.  She said we received the fax Tuesday and that it needs signed and sent back.  Patient can be reached at [phone].

## 2019-03-06 ENCOUNTER — OFFICE VISIT (OUTPATIENT)
Dept: INTERNAL MEDICINE | Facility: CLINIC | Age: 74
End: 2019-03-06

## 2019-03-06 VITALS
DIASTOLIC BLOOD PRESSURE: 84 MMHG | WEIGHT: 205 LBS | SYSTOLIC BLOOD PRESSURE: 138 MMHG | HEART RATE: 80 BPM | OXYGEN SATURATION: 99 % | BODY MASS INDEX: 31.07 KG/M2 | HEIGHT: 68 IN

## 2019-03-06 DIAGNOSIS — R06.2 WHEEZING: ICD-10-CM

## 2019-03-06 DIAGNOSIS — R05.9 COUGH: Primary | ICD-10-CM

## 2019-03-06 DIAGNOSIS — J40 BRONCHITIS: ICD-10-CM

## 2019-03-06 PROCEDURE — 99213 OFFICE O/P EST LOW 20 MIN: CPT | Performed by: NURSE PRACTITIONER

## 2019-03-06 RX ORDER — FLUTICASONE PROPIONATE 50 MCG
2 SPRAY, SUSPENSION (ML) NASAL DAILY
COMMUNITY
End: 2019-06-12

## 2019-03-06 RX ORDER — METHYLPREDNISOLONE 4 MG/1
TABLET ORAL
Qty: 1 EACH | Refills: 0 | Status: SHIPPED | OUTPATIENT
Start: 2019-03-06 | End: 2019-06-12

## 2019-03-06 RX ORDER — DOXYCYCLINE HYCLATE 100 MG/1
100 CAPSULE ORAL 2 TIMES DAILY
Qty: 14 CAPSULE | Refills: 0 | Status: SHIPPED | OUTPATIENT
Start: 2019-03-06 | End: 2019-06-12

## 2019-03-06 RX ORDER — ALBUTEROL SULFATE 90 UG/1
2 AEROSOL, METERED RESPIRATORY (INHALATION) EVERY 6 HOURS PRN
Qty: 1 INHALER | Refills: 0 | Status: SHIPPED | OUTPATIENT
Start: 2019-03-06 | End: 2020-05-14

## 2019-03-06 NOTE — PROGRESS NOTES
Subjective   Maryjane Gaspar is a 74 y.o. female.   Chief Complaint   Patient presents with   • Cough   • Ear Fullness      History of Present Illness  Has been sick since 02/01/2019.  Was seen x3 in ED while in [place].  Cont to have popping of ears and coughing productively.  Saw ENT in [place] on 02/12/ and the 19th.  It was recommended she have her hearing tested but she did not follow through.  She was treated with sudafed.  She has been treated steroids and antibiotics (prednisone and Augmentin, Flonase).  No CXR.   Thinks she's been running a bit of fever.  She has a runny nose.  Has scratchy throat.  Is coughing nonproductively.  She gets short of breath with the cough.  Unsure of wheezing no chest pain.  No nausea vomiting or diarrhea.  I have reviewed her aftercare summary from [place].    The following portions of the patient's history were reviewed and updated as appropriate: allergies, current medications, past family history, past medical history, past social history, past surgical history and problem list.    Current Outpatient Medications:   •  amLODIPine (NORVASC) 5 MG tablet, Take 1 tablet by mouth Daily., Disp: 90 tablet, Rfl: 0  •  fluticasone (FLONASE) 50 MCG/ACT nasal spray, 2 sprays into the nostril(s) as directed by provider Daily., Disp: , Rfl:   •  levocetirizine (XYZAL) 5 MG tablet, Take 1 tablet by mouth Daily., Disp: 90 tablet, Rfl: 3  •  Multiple Vitamin (MULTIVITAMINS PO), Take 1 tablet by mouth daily., Disp: , Rfl:   •  SHINGRIX 50 MCG/0.5ML reconstituted suspension, ADM 0.5ML IM UTD, Disp: , Rfl: 0  •  albuterol sulfate  (90 Base) MCG/ACT inhaler, Inhale 2 puffs Every 6 (Six) Hours As Needed for Wheezing., Disp: 1 inhaler, Rfl: 0  •  doxycycline (VIBRAMYCIN) 100 MG capsule, Take 1 capsule by mouth 2 (Two) Times a Day., Disp: 14 capsule, Rfl: 0  •  methylPREDNISolone (MEDROL, HI,) 4 MG tablet, Take as directed on package instructions., Disp: 1 each, Rfl: 0    Review of Systems  "Consitutional, HEENT, Respiratory, CV, GI, , Skin, Musculoskeletal, Neuro-mental, Endocrinological, Hematological were reviewed.  Positives were discussed in the HPI, otherwise ROS was negative   /84   Pulse 80   Ht 172.7 cm (68\")   Wt 93 kg (205 lb)   SpO2 99%   BMI 31.17 kg/m²     Objective   No Known Allergies    Physical Exam   Constitutional: She is oriented to person, place, and time. She appears well-developed and well-nourished. No distress.   HENT:   Head: Normocephalic.   Right Ear: External ear normal.   Left Ear: External ear normal.   TMs are dull with scattered light reflex.  Throat with PND.  Nontender over sinuses   Eyes: Right eye exhibits no discharge. Left eye exhibits no discharge.   Neck: Neck supple.   Has shotty nodes   Cardiovascular: Normal rate, regular rhythm, normal heart sounds and intact distal pulses. Exam reveals no gallop and no friction rub.   No murmur heard.  Pulmonary/Chest: Effort normal. No stridor. She has wheezes. She has no rales.   Has scattered wheezes and lower lobes   Lymphadenopathy:     She has no cervical adenopathy.   Neurological: She is alert and oriented to person, place, and time.   Skin: Skin is warm and dry. Capillary refill takes less than 2 seconds.   Is pink, no rash    Nursing note and vitals reviewed.      Procedures        Assessment/Plan   Maryjane was seen today for cough and ear fullness.    Diagnoses and all orders for this visit:    Cough  -     XR Chest PA & Lateral; Future  -     doxycycline (VIBRAMYCIN) 100 MG capsule; Take 1 capsule by mouth 2 (Two) Times a Day.  -     methylPREDNISolone (MEDROL, HI,) 4 MG tablet; Take as directed on package instructions.  -     albuterol sulfate  (90 Base) MCG/ACT inhaler; Inhale 2 puffs Every 6 (Six) Hours As Needed for Wheezing.    Bronchitis  -     XR Chest PA & Lateral; Future  -     doxycycline (VIBRAMYCIN) 100 MG capsule; Take 1 capsule by mouth 2 (Two) Times a Day.  -     " methylPREDNISolone (MEDROL, HI,) 4 MG tablet; Take as directed on package instructions.  -     albuterol sulfate  (90 Base) MCG/ACT inhaler; Inhale 2 puffs Every 6 (Six) Hours As Needed for Wheezing.    Wheezing  -     XR Chest PA & Lateral; Future  -     doxycycline (VIBRAMYCIN) 100 MG capsule; Take 1 capsule by mouth 2 (Two) Times a Day.  -     methylPREDNISolone (MEDROL, HI,) 4 MG tablet; Take as directed on package instructions.  -     albuterol sulfate  (90 Base) MCG/ACT inhaler; Inhale 2 puffs Every 6 (Six) Hours As Needed for Wheezing.      Patient Instructions   Recommend continuation of Flonase size saw and Sudafed PE.  We will begin a Medrol Dosepak.  Doxycycline.  Albuterol for wheezing.  PA and lateral chest x-ray.  Call or return to the clinic in 1 week if not improving.  May need ENT referral.  Pt verbalizes understanding and agreement with plan of care. Pt verbalizes understanding and agreement with plan of care.     EMR Dragon/transcription disclaimer:  Please note that portions of this note were completed with a voice recognition program.  Electronic transcription of the voice recognition program may permit erroneous words or phrases to be inadvertently transcribed.  Although I have reviewed the note for such errors, some may still exist in this documentation       [place]

## 2019-03-06 NOTE — PATIENT INSTRUCTIONS
Recommend continuation of Flonase size saw and Sudafed PE.  We will begin a Medrol Dosepak.  Doxycycline.  Albuterol for wheezing.  PA and lateral chest x-ray.  Call or return to the clinic in 1 week if not improving.  May need ENT referral.  Pt verbalizes understanding and agreement with plan of care. Pt verbalizes understanding and agreement with plan of care.

## 2019-03-14 ENCOUNTER — TELEPHONE (OUTPATIENT)
Dept: INTERNAL MEDICINE | Facility: CLINIC | Age: 74
End: 2019-03-14

## 2019-03-14 NOTE — TELEPHONE ENCOUNTER
Spoke with pt stated that she is coughing, spitting up mucous as well as still has fluid in her ears. She has completed the prednisone as well as the antibiotic.

## 2019-03-18 ENCOUNTER — OFFICE VISIT (OUTPATIENT)
Dept: INTERNAL MEDICINE | Facility: CLINIC | Age: 74
End: 2019-03-18

## 2019-03-18 ENCOUNTER — HOSPITAL ENCOUNTER (OUTPATIENT)
Dept: GENERAL RADIOLOGY | Facility: HOSPITAL | Age: 74
Discharge: HOME | End: 2019-03-18
Admitting: NURSE PRACTITIONER

## 2019-03-18 VITALS
HEART RATE: 88 BPM | SYSTOLIC BLOOD PRESSURE: 146 MMHG | WEIGHT: 207.6 LBS | TEMPERATURE: 97.8 F | BODY MASS INDEX: 31.57 KG/M2 | OXYGEN SATURATION: 98 % | DIASTOLIC BLOOD PRESSURE: 84 MMHG

## 2019-03-18 DIAGNOSIS — J40 BRONCHITIS: ICD-10-CM

## 2019-03-18 DIAGNOSIS — R06.2 WHEEZING: ICD-10-CM

## 2019-03-18 DIAGNOSIS — J30.9 ALLERGIC RHINITIS, UNSPECIFIED SEASONALITY, UNSPECIFIED TRIGGER: Primary | Chronic | ICD-10-CM

## 2019-03-18 DIAGNOSIS — R05.9 COUGH: ICD-10-CM

## 2019-03-18 PROCEDURE — 71046 X-RAY EXAM CHEST 2 VIEWS: CPT

## 2019-03-18 PROCEDURE — 99213 OFFICE O/P EST LOW 20 MIN: CPT | Performed by: NURSE PRACTITIONER

## 2019-03-18 RX ORDER — MONTELUKAST SODIUM 10 MG/1
10 TABLET ORAL NIGHTLY
Qty: 30 TABLET | Refills: 2 | Status: SHIPPED | OUTPATIENT
Start: 2019-03-18 | End: 2019-07-22

## 2019-03-18 NOTE — PROGRESS NOTES
Subjective   Maryjane Gaspar is a 74 y.o. female.   Chief Complaint   Patient presents with   • Congestion, Cough, Ear Pain     Office Visit      History of Present Illness As above.   Ears feel full.  No real HA.  Doesn't believe she is nasally congested, has clear sinus DC.  No sore throat, has a tickle. Cough is better feels like there is sputum stuck   Has completed Doxycycline and medrol.  No fever.   Did not have chest x-ray is recommended    The following portions of the patient's history were reviewed and updated as appropriate: allergies, current medications, past family history, past medical history, past social history, past surgical history and problem list.    Current Outpatient Medications:   •  albuterol sulfate  (90 Base) MCG/ACT inhaler, Inhale 2 puffs Every 6 (Six) Hours As Needed for Wheezing., Disp: 1 inhaler, Rfl: 0  •  amLODIPine (NORVASC) 5 MG tablet, Take 1 tablet by mouth Daily., Disp: 90 tablet, Rfl: 0  •  doxycycline (VIBRAMYCIN) 100 MG capsule, Take 1 capsule by mouth 2 (Two) Times a Day., Disp: 14 capsule, Rfl: 0  •  fluticasone (FLONASE) 50 MCG/ACT nasal spray, 2 sprays into the nostril(s) as directed by provider Daily., Disp: , Rfl:   •  levocetirizine (XYZAL) 5 MG tablet, Take 1 tablet by mouth Daily., Disp: 90 tablet, Rfl: 3  •  methylPREDNISolone (MEDROL, HI,) 4 MG tablet, Take as directed on package instructions., Disp: 1 each, Rfl: 0  •  Multiple Vitamin (MULTIVITAMINS PO), Take 1 tablet by mouth daily., Disp: , Rfl:   •  SHINGRIX 50 MCG/0.5ML reconstituted suspension, ADM 0.5ML IM UTD, Disp: , Rfl: 0  •  montelukast (SINGULAIR) 10 MG tablet, Take 1 tablet by mouth Every Night., Disp: 30 tablet, Rfl: 2    Review of Systems Consitutional, HEENT, Respiratory, CV, GI, , Skin, Musculoskeletal, Neuro-mental, Endocrinological, Hematological were reviewed.  Positives were discussed in the HPI, otherwise ROS was negative   /84   Pulse 88   Temp 97.8 °F (36.6 °C)   Wt  94.2 kg (207 lb 9.6 oz)   SpO2 98%   Breastfeeding? No   BMI 31.57 kg/m²     Objective   No Known Allergies    Physical Exam   Constitutional: She is oriented to person, place, and time. She appears well-developed and well-nourished. No distress.   HENT:   Head: Normocephalic.   Right Ear: External ear normal.   Left Ear: External ear normal.   Nose: Nose normal.   Mouth/Throat: Oropharynx is clear and moist.   Nontender over sinuses.  Has clear sinus drainage.  TMs are dull.  Throat pink without exudate   Eyes: Right eye exhibits no discharge. Left eye exhibits no discharge. No scleral icterus.   Neck: Neck supple.   Cardiovascular: Normal rate, regular rhythm, normal heart sounds and intact distal pulses. Exam reveals no gallop and no friction rub.   No murmur heard.  Pulmonary/Chest: Effort normal and breath sounds normal. No stridor. No respiratory distress. She has no wheezes. She has no rales.   Lymphadenopathy:     She has no cervical adenopathy.   Neurological: She is alert and oriented to person, place, and time.   Skin: Skin is warm and dry. Capillary refill takes less than 2 seconds.   Is pink, no rash    Nursing note and vitals reviewed.      Procedures    LABS  Results for orders placed or performed in visit on 12/10/18   Comprehensive Metabolic Panel   Result Value Ref Range    Glucose 84 70 - 100 mg/dL    BUN 12 9 - 23 mg/dL    Creatinine 0.89 0.60 - 1.30 mg/dL    Sodium 140 132 - 146 mmol/L    Potassium 3.7 3.5 - 5.5 mmol/L    Chloride 102 99 - 109 mmol/L    CO2 29.0 20.0 - 31.0 mmol/L    Calcium 9.7 8.7 - 10.4 mg/dL    Total Protein 6.6 5.7 - 8.2 g/dL    Albumin 4.31 3.20 - 4.80 g/dL    ALT (SGPT) 16 7 - 40 U/L    AST (SGOT) 23 0 - 33 U/L    Alkaline Phosphatase 107 (H) 25 - 100 U/L    Total Bilirubin 0.6 0.3 - 1.2 mg/dL    eGFR  African Amer 76 >60 mL/min/1.73    Globulin 2.3 gm/dL    A/G Ratio 1.9 1.5 - 2.5 g/dL    BUN/Creatinine Ratio 13.5 7.0 - 25.0    Anion Gap 9.0 3.0 - 11.0 mmol/L        Assessment/Plan   Maryjane was seen today for congestion, cough, ear pain.    Diagnoses and all orders for this visit:    Allergic rhinitis, unspecified seasonality, unspecified trigger  -     Ambulatory Referral to Allergy  -     montelukast (SINGULAIR) 10 MG tablet; Take 1 tablet by mouth Every Night.      Patient Instructions   We will have patient begin Singulair 10 mg at bedtime.  Recommend having chest x-ray done.  Referral to allergist.  Continue antihistamine of choice.  Decongestant of choice.  I am avoiding steroids as she is just completed a course.  Pt verbalizes understanding and agreement with plan of care.       EMR Dragon/transcription disclaimer:  Please note that portions of this note were completed with a voice recognition program.  Electronic transcription of the voice recognition program may permit erroneous words or phrases to be inadvertently transcribed.  Although I have reviewed the note for such errors, some may still exist in this documentation     Naa Vargas, VAL

## 2019-03-19 ENCOUNTER — TELEPHONE (OUTPATIENT)
Dept: INTERNAL MEDICINE | Facility: CLINIC | Age: 74
End: 2019-03-19

## 2019-04-24 DIAGNOSIS — I10 ESSENTIAL HYPERTENSION: Chronic | ICD-10-CM

## 2019-04-24 RX ORDER — AMLODIPINE BESYLATE 5 MG/1
5 TABLET ORAL DAILY
Qty: 90 TABLET | Refills: 0 | Status: CANCELLED | OUTPATIENT
Start: 2019-04-24

## 2019-04-25 DIAGNOSIS — I10 ESSENTIAL HYPERTENSION: Chronic | ICD-10-CM

## 2019-04-25 RX ORDER — AMLODIPINE BESYLATE 5 MG/1
5 TABLET ORAL DAILY
Qty: 90 TABLET | Refills: 0 | Status: SHIPPED | OUTPATIENT
Start: 2019-04-25 | End: 2019-06-12

## 2019-06-12 ENCOUNTER — OFFICE VISIT (OUTPATIENT)
Dept: INTERNAL MEDICINE | Facility: CLINIC | Age: 74
End: 2019-06-12

## 2019-06-12 VITALS
HEART RATE: 79 BPM | BODY MASS INDEX: 32.43 KG/M2 | WEIGHT: 214 LBS | DIASTOLIC BLOOD PRESSURE: 86 MMHG | SYSTOLIC BLOOD PRESSURE: 142 MMHG | RESPIRATION RATE: 18 BRPM | HEIGHT: 68 IN | OXYGEN SATURATION: 98 %

## 2019-06-12 DIAGNOSIS — L98.9 SKIN LESIONS: Primary | ICD-10-CM

## 2019-06-12 DIAGNOSIS — I10 ESSENTIAL HYPERTENSION: Chronic | ICD-10-CM

## 2019-06-12 PROBLEM — M54.50 LOW BACK PAIN: Status: ACTIVE | Noted: 2019-06-12

## 2019-06-12 PROBLEM — T14.8XXA MUSCLE STRAIN: Status: ACTIVE | Noted: 2019-06-12

## 2019-06-12 PROBLEM — K63.5 COLON POLYP: Status: ACTIVE | Noted: 2019-06-12

## 2019-06-12 PROBLEM — S13.9XXA NECK SPRAIN: Status: ACTIVE | Noted: 2019-06-12

## 2019-06-12 LAB
ALBUMIN SERPL-MCNC: 4.2 G/DL (ref 3.5–5.2)
ALBUMIN/GLOB SERPL: 1.5 G/DL
ALP SERPL-CCNC: 84 U/L (ref 39–117)
ALT SERPL W P-5'-P-CCNC: 9 U/L (ref 1–33)
ANION GAP SERPL CALCULATED.3IONS-SCNC: 9.8 MMOL/L
AST SERPL-CCNC: 15 U/L (ref 1–32)
BILIRUB SERPL-MCNC: 0.5 MG/DL (ref 0.2–1.2)
BUN BLD-MCNC: 11 MG/DL (ref 8–23)
BUN/CREAT SERPL: 12.4 (ref 7–25)
CALCIUM SPEC-SCNC: 10 MG/DL (ref 8.6–10.5)
CHLORIDE SERPL-SCNC: 105 MMOL/L (ref 98–107)
CO2 SERPL-SCNC: 28.2 MMOL/L (ref 22–29)
CREAT BLD-MCNC: 0.89 MG/DL (ref 0.57–1)
GFR SERPL CREATININE-BSD FRML MDRD: 75 ML/MIN/1.73
GLOBULIN UR ELPH-MCNC: 2.8 GM/DL
GLUCOSE BLD-MCNC: 80 MG/DL (ref 65–99)
POTASSIUM BLD-SCNC: 3.7 MMOL/L (ref 3.5–5.2)
PROT SERPL-MCNC: 7 G/DL (ref 6–8.5)
SODIUM BLD-SCNC: 143 MMOL/L (ref 136–145)

## 2019-06-12 PROCEDURE — 80053 COMPREHEN METABOLIC PANEL: CPT | Performed by: NURSE PRACTITIONER

## 2019-06-12 PROCEDURE — 99213 OFFICE O/P EST LOW 20 MIN: CPT | Performed by: NURSE PRACTITIONER

## 2019-06-12 RX ORDER — AMLODIPINE BESYLATE 5 MG/1
5 TABLET ORAL DAILY
Qty: 90 TABLET | Refills: 3 | Status: CANCELLED | OUTPATIENT
Start: 2019-06-12

## 2019-06-12 RX ORDER — AMLODIPINE BESYLATE 10 MG/1
10 TABLET ORAL DAILY
Qty: 90 TABLET | Refills: 1 | Status: SHIPPED | OUTPATIENT
Start: 2019-06-12 | End: 2019-08-26 | Stop reason: SDUPTHER
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
## 2019-06-13 NOTE — PATIENT INSTRUCTIONS
We will increase amlodipine to 10 mg daily, otherwise no meds were changed.  CMP.  Repeat blood pressure monitoring at least once weekly.  referral to dermatology regarding skin lesions.  Return to the clinic in 1 month, sooner if needed.

## 2019-07-22 ENCOUNTER — OFFICE VISIT (OUTPATIENT)
Dept: INTERNAL MEDICINE | Facility: CLINIC | Age: 74
End: 2019-07-22

## 2019-07-22 VITALS
RESPIRATION RATE: 16 BRPM | BODY MASS INDEX: 32.13 KG/M2 | WEIGHT: 212 LBS | SYSTOLIC BLOOD PRESSURE: 122 MMHG | HEART RATE: 86 BPM | DIASTOLIC BLOOD PRESSURE: 80 MMHG | OXYGEN SATURATION: 99 % | HEIGHT: 68 IN

## 2019-07-22 DIAGNOSIS — I10 ESSENTIAL HYPERTENSION: Primary | ICD-10-CM

## 2019-07-22 PROCEDURE — 99213 OFFICE O/P EST LOW 20 MIN: CPT | Performed by: NURSE PRACTITIONER

## 2019-07-22 NOTE — PROGRESS NOTES
"Subjective   Maryjane Gaspar is a 74 y.o. female.   Chief Complaint   Patient presents with   • Hypertension     Follow Up   • Skin Lesion      History of Present Illness  Has appt with derm next month for leg lesion.  Patient reports she is feeling well.  She reports her blood pressure has been under good control.  She is getting ready to go back to [place] for several weeks.  She says she will monitor her blood pressure there.  Patient denies fever chills, headache, ear pain, sore throat, shortness of air, cough, wheezing, chest pain, abdominal pain, nausea, vomiting, diarrhea, dysuria, blood in stool or urine.  Mood is good.  Eating and drinking as usual.  No unexplained weight loss or gain.      The following portions of the patient's history were reviewed and updated as appropriate: allergies, current medications, past family history, past medical history, past social history, past surgical history and problem list.    Current Outpatient Medications:   •  albuterol sulfate  (90 Base) MCG/ACT inhaler, Inhale 2 puffs Every 6 (Six) Hours As Needed for Wheezing., Disp: 1 inhaler, Rfl: 0  •  amLODIPine (NORVASC) 10 MG tablet, Take 1 tablet by mouth Daily., Disp: 90 tablet, Rfl: 1  •  Multiple Vitamin (MULTIVITAMINS PO), Take 1 tablet by mouth daily., Disp: , Rfl:   •  SHINGRIX 50 MCG/0.5ML reconstituted suspension, ADM 0.5ML IM UTD, Disp: , Rfl: 0    Review of Systems Consitutional, HEENT, Respiratory, CV, GI, , Skin, Musculoskeletal, Neuro-mental, Endocrinological, Hematological were reviewed.  Positives were discussed in the HPI, otherwise ROS was negative   /80   Pulse 86   Resp 16   Ht 172.7 cm (68\")   Wt 96.2 kg (212 lb)   SpO2 99%   Breastfeeding? No   BMI 32.23 kg/m²     Objective   No Known Allergies    Physical Exam   Constitutional: She is oriented to person, place, and time. She appears well-developed and well-nourished. No distress.   HENT:   Head: Normocephalic.   Right Ear: " External ear normal.   Left Ear: External ear normal.   Nose: Nose normal.   Mouth/Throat: Oropharynx is clear and moist.   TMs are clear.   Eyes: Right eye exhibits no discharge. Left eye exhibits no discharge. No scleral icterus.   Neck: Neck supple.   Cardiovascular: Normal rate, regular rhythm, normal heart sounds and intact distal pulses. Exam reveals no gallop and no friction rub.   No murmur heard.  Pulmonary/Chest: Effort normal and breath sounds normal. No stridor. No respiratory distress. She has no wheezes. She has no rales.   Abdominal: Soft. She exhibits no mass. There is no tenderness.   Musculoskeletal:   REY well.  Gait upright and steady    Lymphadenopathy:     She has no cervical adenopathy.   Neurological: She is alert and oriented to person, place, and time.   Skin: Skin is warm and dry. Capillary refill takes less than 2 seconds.   Is pink, no rash    Nursing note and vitals reviewed.      Procedures    LABS  Results for orders placed or performed in visit on 06/12/19   Comprehensive Metabolic Panel   Result Value Ref Range    Glucose 80 65 - 99 mg/dL    BUN 11 8 - 23 mg/dL    Creatinine 0.89 0.57 - 1.00 mg/dL    Sodium 143 136 - 145 mmol/L    Potassium 3.7 3.5 - 5.2 mmol/L    Chloride 105 98 - 107 mmol/L    CO2 28.2 22.0 - 29.0 mmol/L    Calcium 10.0 8.6 - 10.5 mg/dL    Total Protein 7.0 6.0 - 8.5 g/dL    Albumin 4.20 3.50 - 5.20 g/dL    ALT (SGPT) 9 1 - 33 U/L    AST (SGOT) 15 1 - 32 U/L    Alkaline Phosphatase 84 39 - 117 U/L    Total Bilirubin 0.5 0.2 - 1.2 mg/dL    eGFR  African Amer 75 >60 mL/min/1.73    Globulin 2.8 gm/dL    A/G Ratio 1.5 g/dL    BUN/Creatinine Ratio 12.4 7.0 - 25.0    Anion Gap 9.8 mmol/L       Assessment/Plan   Maryjane was seen today for hypertension and skin lesion.    Diagnoses and all orders for this visit:    Essential hypertension      Patient Instructions   Reminded need to monitor blood pressure once weekly.  Our goal is to keep you around 120/70.  Please stick  to a low-fat low-cholesterol low-sodium diet.  I did not change any medicines or refill any medicines.Pt verbalizes understanding and agreement with plan of care.     EMR Dragon/transcription disclaimer:  Please note that portions of this note were completed with a voice recognition program.  Electronic transcription of the voice recognition program may permit erroneous words or phrases to be inadvertently transcribed.  Although I have reviewed the note for such errors, some may still exist in this documentation     Naa Vargas APRN

## 2019-07-29 NOTE — PATIENT INSTRUCTIONS
Reminded need to monitor blood pressure once weekly.  Our goal is to keep you around 120/70.  Please stick to a low-fat low-cholesterol low-sodium diet.  I did not change any medicines or refill any medicines.Pt verbalizes understanding and agreement with plan of care.

## 2019-08-26 DIAGNOSIS — I10 ESSENTIAL HYPERTENSION: Chronic | ICD-10-CM

## 2019-08-26 RX ORDER — AMLODIPINE BESYLATE 10 MG/1
10 TABLET ORAL DAILY
Qty: 90 TABLET | Refills: 0 | Status: SHIPPED | OUTPATIENT
Start: 2019-08-26 | End: 2020-03-16

## 2019-09-13 ENCOUNTER — TRANSCRIBE ORDERS (OUTPATIENT)
Dept: ADMINISTRATIVE | Facility: HOSPITAL | Age: 74
End: 2019-09-13

## 2019-09-13 DIAGNOSIS — Z12.31 VISIT FOR SCREENING MAMMOGRAM: Primary | ICD-10-CM

## 2019-10-11 ENCOUNTER — FLU SHOT (OUTPATIENT)
Dept: INTERNAL MEDICINE | Facility: CLINIC | Age: 74
End: 2019-10-11

## 2019-10-11 DIAGNOSIS — Z23 NEEDS FLU SHOT: Primary | ICD-10-CM

## 2019-10-11 PROCEDURE — G0008 ADMIN INFLUENZA VIRUS VAC: HCPCS | Performed by: NURSE PRACTITIONER

## 2019-10-11 PROCEDURE — 90653 IIV ADJUVANT VACCINE IM: CPT | Performed by: NURSE PRACTITIONER

## 2019-11-14 ENCOUNTER — HOSPITAL ENCOUNTER (OUTPATIENT)
Dept: MAMMOGRAPHY | Facility: HOSPITAL | Age: 74
Discharge: HOME | End: 2019-11-14
Admitting: NURSE PRACTITIONER

## 2019-11-14 DIAGNOSIS — Z12.31 VISIT FOR SCREENING MAMMOGRAM: ICD-10-CM

## 2019-11-14 PROCEDURE — 77067 SCR MAMMO BI INCL CAD: CPT | Performed by: RADIOLOGY

## 2019-11-14 PROCEDURE — 77067 SCR MAMMO BI INCL CAD: CPT

## 2019-11-14 PROCEDURE — 77063 BREAST TOMOSYNTHESIS BI: CPT

## 2019-11-14 PROCEDURE — 77063 BREAST TOMOSYNTHESIS BI: CPT | Performed by: RADIOLOGY

## 2020-02-19 ENCOUNTER — TELEPHONE (OUTPATIENT)
Dept: INTERNAL MEDICINE | Facility: CLINIC | Age: 75
End: 2020-02-19

## 2020-03-15 DIAGNOSIS — I10 ESSENTIAL HYPERTENSION: Chronic | ICD-10-CM

## 2020-03-16 RX ORDER — AMLODIPINE BESYLATE 10 MG/1
10 TABLET ORAL DAILY
Qty: 90 TABLET | Refills: 0 | Status: SHIPPED | OUTPATIENT
Start: 2020-03-16 | End: 2020-03-25 | Stop reason: SDUPTHER

## 2020-03-25 DIAGNOSIS — I10 ESSENTIAL HYPERTENSION: Chronic | ICD-10-CM

## 2020-03-25 RX ORDER — AMLODIPINE BESYLATE 10 MG/1
10 TABLET ORAL DAILY
Qty: 90 TABLET | Refills: 0 | Status: SHIPPED | OUTPATIENT
Start: 2020-03-25 | End: 2020-06-05 | Stop reason: SDUPTHER

## 2020-05-14 ENCOUNTER — LAB (OUTPATIENT)
Dept: LAB | Facility: HOSPITAL | Age: 75
End: 2020-05-14

## 2020-05-14 ENCOUNTER — OFFICE VISIT (OUTPATIENT)
Dept: INTERNAL MEDICINE | Facility: CLINIC | Age: 75
End: 2020-05-14

## 2020-05-14 VITALS
HEART RATE: 88 BPM | OXYGEN SATURATION: 100 % | BODY MASS INDEX: 30.92 KG/M2 | SYSTOLIC BLOOD PRESSURE: 132 MMHG | HEIGHT: 68 IN | WEIGHT: 204 LBS | TEMPERATURE: 97.4 F | DIASTOLIC BLOOD PRESSURE: 80 MMHG

## 2020-05-14 DIAGNOSIS — I10 ESSENTIAL HYPERTENSION: Primary | ICD-10-CM

## 2020-05-14 DIAGNOSIS — Z13.220 SCREENING, LIPID: ICD-10-CM

## 2020-05-14 DIAGNOSIS — Z00.00 HEALTHCARE MAINTENANCE: ICD-10-CM

## 2020-05-14 DIAGNOSIS — H60.593 OTHER NONINFECTIVE ACUTE OTITIS EXTERNA OF BOTH EARS: ICD-10-CM

## 2020-05-14 DIAGNOSIS — J30.9 ALLERGIC RHINITIS, UNSPECIFIED SEASONALITY, UNSPECIFIED TRIGGER: Chronic | ICD-10-CM

## 2020-05-14 DIAGNOSIS — I10 ESSENTIAL HYPERTENSION: ICD-10-CM

## 2020-05-14 PROBLEM — K63.5 COLON POLYP: Status: RESOLVED | Noted: 2019-06-12 | Resolved: 2020-05-14

## 2020-05-14 PROBLEM — T14.8XXA MUSCLE STRAIN: Status: RESOLVED | Noted: 2019-06-12 | Resolved: 2020-05-14

## 2020-05-14 PROBLEM — M54.50 LOW BACK PAIN: Status: RESOLVED | Noted: 2019-06-12 | Resolved: 2020-05-14

## 2020-05-14 PROBLEM — S13.9XXA NECK SPRAIN: Status: RESOLVED | Noted: 2019-06-12 | Resolved: 2020-05-14

## 2020-05-14 LAB
ALBUMIN SERPL-MCNC: 4.6 G/DL (ref 3.5–5.2)
ALBUMIN/GLOB SERPL: 1.7 G/DL
ALP SERPL-CCNC: 102 U/L (ref 39–117)
ALT SERPL W P-5'-P-CCNC: 9 U/L (ref 1–33)
ANION GAP SERPL CALCULATED.3IONS-SCNC: 12.1 MMOL/L (ref 5–15)
AST SERPL-CCNC: 14 U/L (ref 1–32)
BILIRUB SERPL-MCNC: 0.4 MG/DL (ref 0.2–1.2)
BUN BLD-MCNC: 12 MG/DL (ref 8–23)
BUN/CREAT SERPL: 12.5 (ref 7–25)
CALCIUM SPEC-SCNC: 9.7 MG/DL (ref 8.6–10.5)
CHLORIDE SERPL-SCNC: 102 MMOL/L (ref 98–107)
CHOLEST SERPL-MCNC: 171 MG/DL (ref 0–200)
CO2 SERPL-SCNC: 26.9 MMOL/L (ref 22–29)
CREAT BLD-MCNC: 0.96 MG/DL (ref 0.57–1)
DEPRECATED RDW RBC AUTO: 40.4 FL (ref 37–54)
ERYTHROCYTE [DISTWIDTH] IN BLOOD BY AUTOMATED COUNT: 12.5 % (ref 12.3–15.4)
GFR SERPL CREATININE-BSD FRML MDRD: 69 ML/MIN/1.73
GLOBULIN UR ELPH-MCNC: 2.7 GM/DL
GLUCOSE BLD-MCNC: 94 MG/DL (ref 65–99)
HCT VFR BLD AUTO: 40.6 % (ref 34–46.6)
HDLC SERPL-MCNC: 67 MG/DL (ref 40–60)
HGB BLD-MCNC: 13 G/DL (ref 12–15.9)
LDLC SERPL CALC-MCNC: 88 MG/DL (ref 0–100)
LDLC/HDLC SERPL: 1.32 {RATIO}
MCH RBC QN AUTO: 28.4 PG (ref 26.6–33)
MCHC RBC AUTO-ENTMCNC: 32 G/DL (ref 31.5–35.7)
MCV RBC AUTO: 88.8 FL (ref 79–97)
PLATELET # BLD AUTO: 336 10*3/MM3 (ref 140–450)
PMV BLD AUTO: 10.6 FL (ref 6–12)
POTASSIUM BLD-SCNC: 3.9 MMOL/L (ref 3.5–5.2)
PROT SERPL-MCNC: 7.3 G/DL (ref 6–8.5)
RBC # BLD AUTO: 4.57 10*6/MM3 (ref 3.77–5.28)
SODIUM BLD-SCNC: 141 MMOL/L (ref 136–145)
TRIGL SERPL-MCNC: 79 MG/DL (ref 0–150)
VLDLC SERPL-MCNC: 15.8 MG/DL (ref 5–40)
WBC NRBC COR # BLD: 6.02 10*3/MM3 (ref 3.4–10.8)

## 2020-05-14 PROCEDURE — 80053 COMPREHEN METABOLIC PANEL: CPT | Performed by: INTERNAL MEDICINE

## 2020-05-14 PROCEDURE — 85027 COMPLETE CBC AUTOMATED: CPT | Performed by: INTERNAL MEDICINE

## 2020-05-14 PROCEDURE — 80061 LIPID PANEL: CPT | Performed by: INTERNAL MEDICINE

## 2020-05-14 PROCEDURE — 99214 OFFICE O/P EST MOD 30 MIN: CPT | Performed by: INTERNAL MEDICINE

## 2020-05-14 RX ORDER — HYDROCORTISONE AND ACETIC ACID 20.75; 10.375 MG/ML; MG/ML
5 SOLUTION AURICULAR (OTIC) 3 TIMES DAILY
Qty: 10 ML | Refills: 2 | Status: SHIPPED | OUTPATIENT
Start: 2020-05-14 | End: 2021-11-16
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
## 2020-06-05 DIAGNOSIS — I10 ESSENTIAL HYPERTENSION: Chronic | ICD-10-CM

## 2020-06-05 RX ORDER — AMLODIPINE BESYLATE 10 MG/1
10 TABLET ORAL DAILY
Qty: 90 TABLET | Refills: 0 | Status: SHIPPED | OUTPATIENT
Start: 2020-06-05 | End: 2020-08-25 | Stop reason: SDUPTHER

## 2020-08-04 ENCOUNTER — OFFICE VISIT (OUTPATIENT)
Dept: INTERNAL MEDICINE | Facility: CLINIC | Age: 75
End: 2020-08-04

## 2020-08-04 ENCOUNTER — LAB (OUTPATIENT)
Dept: LAB | Facility: HOSPITAL | Age: 75
End: 2020-08-04

## 2020-08-04 VITALS
HEART RATE: 88 BPM | BODY MASS INDEX: 31.69 KG/M2 | SYSTOLIC BLOOD PRESSURE: 148 MMHG | DIASTOLIC BLOOD PRESSURE: 76 MMHG | OXYGEN SATURATION: 100 % | WEIGHT: 208.4 LBS

## 2020-08-04 DIAGNOSIS — M79.89 LEG SWELLING: ICD-10-CM

## 2020-08-04 DIAGNOSIS — M79.89 LEG SWELLING: Primary | ICD-10-CM

## 2020-08-04 DIAGNOSIS — M79.89 RIGHT LEG SWELLING: Primary | ICD-10-CM

## 2020-08-04 LAB
ALBUMIN SERPL-MCNC: 4.4 G/DL (ref 3.5–5.2)
ALBUMIN/GLOB SERPL: 1.5 G/DL
ALP SERPL-CCNC: 91 U/L (ref 39–117)
ALT SERPL W P-5'-P-CCNC: 11 U/L (ref 1–33)
ANION GAP SERPL CALCULATED.3IONS-SCNC: 8.6 MMOL/L (ref 5–15)
AST SERPL-CCNC: 19 U/L (ref 1–32)
BILIRUB SERPL-MCNC: 0.4 MG/DL (ref 0–1.2)
BUN SERPL-MCNC: 9 MG/DL (ref 8–23)
BUN/CREAT SERPL: 8.6 (ref 7–25)
CALCIUM SPEC-SCNC: 10.3 MG/DL (ref 8.6–10.5)
CHLORIDE SERPL-SCNC: 107 MMOL/L (ref 98–107)
CO2 SERPL-SCNC: 26.4 MMOL/L (ref 22–29)
CREAT SERPL-MCNC: 1.05 MG/DL (ref 0.57–1)
D DIMER PPP FEU-MCNC: 0.87 MCGFEU/ML (ref 0–0.56)
ERYTHROCYTE [SEDIMENTATION RATE] IN BLOOD: 8 MM/HR (ref 0–30)
GFR SERPL CREATININE-BSD FRML MDRD: 62 ML/MIN/1.73
GLOBULIN UR ELPH-MCNC: 2.9 GM/DL
GLUCOSE SERPL-MCNC: 90 MG/DL (ref 65–99)
NT-PROBNP SERPL-MCNC: 91.4 PG/ML (ref 0–900)
POTASSIUM SERPL-SCNC: 4.1 MMOL/L (ref 3.5–5.2)
PROT SERPL-MCNC: 7.3 G/DL (ref 6–8.5)
SODIUM SERPL-SCNC: 142 MMOL/L (ref 136–145)

## 2020-08-04 PROCEDURE — 99213 OFFICE O/P EST LOW 20 MIN: CPT | Performed by: NURSE PRACTITIONER

## 2020-08-04 PROCEDURE — 85379 FIBRIN DEGRADATION QUANT: CPT | Performed by: NURSE PRACTITIONER

## 2020-08-04 PROCEDURE — 80053 COMPREHEN METABOLIC PANEL: CPT | Performed by: NURSE PRACTITIONER

## 2020-08-04 PROCEDURE — 85652 RBC SED RATE AUTOMATED: CPT | Performed by: NURSE PRACTITIONER

## 2020-08-04 PROCEDURE — 83880 ASSAY OF NATRIURETIC PEPTIDE: CPT | Performed by: NURSE PRACTITIONER

## 2020-08-04 NOTE — PATIENT INSTRUCTIONS
"DASH Eating Plan  DASH stands for \"Dietary Approaches to Stop Hypertension.\" The DASH eating plan is a healthy eating plan that has been shown to reduce high blood pressure (hypertension). It may also reduce your risk for type 2 diabetes, heart disease, and stroke. The DASH eating plan may also help with weight loss.  What are tips for following this plan?    General guidelines  · Avoid eating more than 2,300 mg (milligrams) of salt (sodium) a day. If you have hypertension, you may need to reduce your sodium intake to 1,500 mg a day.  · Limit alcohol intake to no more than 1 drink a day for nonpregnant women and 2 drinks a day for men. One drink equals 12 oz of beer, 5 oz of wine, or 1½ oz of hard liquor.  · Work with your health care provider to maintain a healthy body weight or to lose weight. Ask what an ideal weight is for you.  · Get at least 30 minutes of exercise that causes your heart to beat faster (aerobic exercise) most days of the week. Activities may include walking, swimming, or biking.  · Work with your health care provider or diet and nutrition specialist (dietitian) to adjust your eating plan to your individual calorie needs.  Reading food labels    · Check food labels for the amount of sodium per serving. Choose foods with less than 5 percent of the Daily Value of sodium. Generally, foods with less than 300 mg of sodium per serving fit into this eating plan.  · To find whole grains, look for the word \"whole\" as the first word in the ingredient list.  Shopping  · Buy products labeled as \"low-sodium\" or \"no salt added.\"  · Buy fresh foods. Avoid canned foods and premade or frozen meals.  Cooking  · Avoid adding salt when cooking. Use salt-free seasonings or herbs instead of table salt or sea salt. Check with your health care provider or pharmacist before using salt substitutes.  · Do not cline foods. Cook foods using healthy methods such as baking, boiling, grilling, and broiling instead.  · Cook with " heart-healthy oils, such as olive, canola, soybean, or sunflower oil.  Meal planning  · Eat a balanced diet that includes:  ? 5 or more servings of fruits and vegetables each day. At each meal, try to fill half of your plate with fruits and vegetables.  ? Up to 6-8 servings of whole grains each day.  ? Less than 6 oz of lean meat, poultry, or fish each day. A 3-oz serving of meat is about the same size as a deck of cards. One egg equals 1 oz.  ? 2 servings of low-fat dairy each day.  ? A serving of nuts, seeds, or beans 5 times each week.  ? Heart-healthy fats. Healthy fats called Omega-3 fatty acids are found in foods such as flaxseeds and coldwater fish, like sardines, salmon, and mackerel.  · Limit how much you eat of the following:  ? Canned or prepackaged foods.  ? Food that is high in trans fat, such as fried foods.  ? Food that is high in saturated fat, such as fatty meat.  ? Sweets, desserts, sugary drinks, and other foods with added sugar.  ? Full-fat dairy products.  · Do not salt foods before eating.  · Try to eat at least 2 vegetarian meals each week.  · Eat more home-cooked food and less restaurant, buffet, and fast food.  · When eating at a restaurant, ask that your food be prepared with less salt or no salt, if possible.  What foods are recommended?  The items listed may not be a complete list. Talk with your dietitian about what dietary choices are best for you.  Grains  Whole-grain or whole-wheat bread. Whole-grain or whole-wheat pasta. Brown rice. Oatmeal. Quinoa. Bulgur. Whole-grain and low-sodium cereals. Deyanira bread. Low-fat, low-sodium crackers. Whole-wheat flour tortillas.  Vegetables  Fresh or frozen vegetables (raw, steamed, roasted, or grilled). Low-sodium or reduced-sodium tomato and vegetable juice. Low-sodium or reduced-sodium tomato sauce and tomato paste. Low-sodium or reduced-sodium canned vegetables.  Fruits  All fresh, dried, or frozen fruit. Canned fruit in natural juice (without  added sugar).  Meat and other protein foods  Skinless chicken or turkey. Ground chicken or turkey. Pork with fat trimmed off. Fish and seafood. Egg whites. Dried beans, peas, or lentils. Unsalted nuts, nut butters, and seeds. Unsalted canned beans. Lean cuts of beef with fat trimmed off. Low-sodium, lean deli meat.  Dairy  Low-fat (1%) or fat-free (skim) milk. Fat-free, low-fat, or reduced-fat cheeses. Nonfat, low-sodium ricotta or cottage cheese. Low-fat or nonfat yogurt. Low-fat, low-sodium cheese.  Fats and oils  Soft margarine without trans fats. Vegetable oil. Low-fat, reduced-fat, or light mayonnaise and salad dressings (reduced-sodium). Canola, safflower, olive, soybean, and sunflower oils. Avocado.  Seasoning and other foods  Herbs. Spices. Seasoning mixes without salt. Unsalted popcorn and pretzels. Fat-free sweets.  What foods are not recommended?  The items listed may not be a complete list. Talk with your dietitian about what dietary choices are best for you.  Grains  Baked goods made with fat, such as croissants, muffins, or some breads. Dry pasta or rice meal packs.  Vegetables  Creamed or fried vegetables. Vegetables in a cheese sauce. Regular canned vegetables (not low-sodium or reduced-sodium). Regular canned tomato sauce and paste (not low-sodium or reduced-sodium). Regular tomato and vegetable juice (not low-sodium or reduced-sodium). Pickles. Olives.  Fruits  Canned fruit in a light or heavy syrup. Fried fruit. Fruit in cream or butter sauce.  Meat and other protein foods  Fatty cuts of meat. Ribs. Fried meat. Parker. Sausage. Bologna and other processed lunch meats. Salami. Fatback. Hotdogs. Bratwurst. Salted nuts and seeds. Canned beans with added salt. Canned or smoked fish. Whole eggs or egg yolks. Chicken or turkey with skin.  Dairy  Whole or 2% milk, cream, and half-and-half. Whole or full-fat cream cheese. Whole-fat or sweetened yogurt. Full-fat cheese. Nondairy creamers. Whipped toppings.  Processed cheese and cheese spreads.  Fats and oils  Butter. Stick margarine. Lard. Shortening. Ghee. Parker fat. Tropical oils, such as coconut, palm kernel, or palm oil.  Seasoning and other foods  Salted popcorn and pretzels. Onion salt, garlic salt, seasoned salt, table salt, and sea salt. Worcestershire sauce. Tartar sauce. Barbecue sauce. Teriyaki sauce. Soy sauce, including reduced-sodium. Steak sauce. Canned and packaged gravies. Fish sauce. Oyster sauce. Cocktail sauce. Horseradish that you find on the shelf. Ketchup. Mustard. Meat flavorings and tenderizers. Bouillon cubes. Hot sauce and Tabasco sauce. Premade or packaged marinades. Premade or packaged taco seasonings. Relishes. Regular salad dressings.  Where to find more information:  · National Heart, Lung, and Blood [place]: www.nhlbi.nih.gov  · American Heart Association: www.heart.org  Summary  · The DASH eating plan is a healthy eating plan that has been shown to reduce high blood pressure (hypertension). It may also reduce your risk for type 2 diabetes, heart disease, and stroke.  · With the DASH eating plan, you should limit salt (sodium) intake to 2,300 mg a day. If you have hypertension, you may need to reduce your sodium intake to 1,500 mg a day.  · When on the DASH eating plan, aim to eat more fresh fruits and vegetables, whole grains, lean proteins, low-fat dairy, and heart-healthy fats.  · Work with your health care provider or diet and nutrition specialist (dietitian) to adjust your eating plan to your individual calorie needs.  This information is not intended to replace advice given to you by your health care provider. Make sure you discuss any questions you have with your health care provider.  Document Released: 12/06/2012 Document Revised: 11/30/2018 Document Reviewed: 12/11/2017  Elsevier Patient Education © 2020 [employer]
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
## 2020-08-05 ENCOUNTER — TRANSCRIBE ORDERS (OUTPATIENT)
Dept: INTERNAL MEDICINE | Facility: CLINIC | Age: 75
End: 2020-08-05

## 2020-08-05 DIAGNOSIS — M79.89 RIGHT LEG SWELLING: Primary | ICD-10-CM

## 2020-08-05 DIAGNOSIS — M79.89 RIGHT LEG SWELLING: ICD-10-CM

## 2020-08-25 DIAGNOSIS — I10 ESSENTIAL HYPERTENSION: Chronic | ICD-10-CM

## 2020-08-25 RX ORDER — AMLODIPINE BESYLATE 10 MG/1
10 TABLET ORAL DAILY
Qty: 90 TABLET | Refills: 0 | Status: SHIPPED | OUTPATIENT
Start: 2020-08-25 | End: 2020-11-12 | Stop reason: SDUPTHER

## 2020-10-01 ENCOUNTER — TRANSCRIBE ORDERS (OUTPATIENT)
Dept: ADMINISTRATIVE | Facility: HOSPITAL | Age: 75
End: 2020-10-01

## 2020-10-01 DIAGNOSIS — Z12.31 VISIT FOR SCREENING MAMMOGRAM: Primary | ICD-10-CM

## 2020-11-12 ENCOUNTER — OFFICE VISIT (OUTPATIENT)
Dept: INTERNAL MEDICINE | Facility: CLINIC | Age: 75
End: 2020-11-12

## 2020-11-12 VITALS
TEMPERATURE: 97.5 F | BODY MASS INDEX: 30.74 KG/M2 | OXYGEN SATURATION: 98 % | HEART RATE: 70 BPM | HEIGHT: 68 IN | SYSTOLIC BLOOD PRESSURE: 136 MMHG | DIASTOLIC BLOOD PRESSURE: 84 MMHG | WEIGHT: 202.8 LBS

## 2020-11-12 DIAGNOSIS — Z00.00 MEDICARE ANNUAL WELLNESS VISIT, SUBSEQUENT: Primary | ICD-10-CM

## 2020-11-12 DIAGNOSIS — M85.88 OTHER SPECIFIED DISORDERS OF BONE DENSITY AND STRUCTURE, OTHER SITE: ICD-10-CM

## 2020-11-12 DIAGNOSIS — I10 ESSENTIAL HYPERTENSION: Chronic | ICD-10-CM

## 2020-11-12 DIAGNOSIS — J30.9 ALLERGIC RHINITIS, UNSPECIFIED SEASONALITY, UNSPECIFIED TRIGGER: Chronic | ICD-10-CM

## 2020-11-12 PROBLEM — H81.10 BPPV (BENIGN PAROXYSMAL POSITIONAL VERTIGO): Status: RESOLVED | Noted: 2017-01-10 | Resolved: 2020-11-12

## 2020-11-12 PROCEDURE — G0439 PPPS, SUBSEQ VISIT: HCPCS | Performed by: INTERNAL MEDICINE

## 2020-11-12 RX ORDER — AMLODIPINE BESYLATE 10 MG/1
10 TABLET ORAL DAILY
Qty: 90 TABLET | Refills: 3 | Status: SHIPPED | OUTPATIENT
Start: 2020-11-12 | End: 2021-11-16 | Stop reason: SDUPTHER

## 2020-11-12 RX ORDER — CHLORAL HYDRATE 500 MG
2000 CAPSULE ORAL
COMMUNITY

## 2020-11-12 RX ORDER — GLYCERIN/PROPYLENE GLYCOL 0.6 %-0.6%
DROPPERETTE, SINGLE-USE DROP DISPENSER OPHTHALMIC (EYE)
COMMUNITY

## 2020-11-12 NOTE — PROGRESS NOTES
The ABCs of the Annual Wellness Visit  Subsequent Medicare Wellness Visit    Chief Complaint   Patient presents with   • Medicare Wellness-subsequent       Subjective   History of Present Illness:  Maryjane Gaspar is a 75 y.o. female who presents for a Subsequent Medicare Wellness Visit. Doing well. Trying to find time to get back to [place] before [place]. Had some swelling in RLE and saw Aliyah, workup for DVT negative. Has recurred intermittently but never persists. Has dry eyes and has been using warm compresses, OTC eye drops. No acute concerns. Has mammogram scheduled [place] cesar.    HEALTH RISK ASSESSMENT    Recent Hospitalizations:  No hospitalization(s) within the last year.    Current Medical Providers:  Patient Care Team:  Jesenia Uriostegui MD as PCP - General (Internal Medicine)    Smoking Status:  Social History     Tobacco Use   Smoking Status Former Smoker   • Packs/day: 0.25   • Years: 10.00   • Pack years: 2.50   • Types: Cigarettes   • Quit date:    • Years since quittin.8   Smokeless Tobacco Never Used   Tobacco Comment    4 cigarettes a day       Alcohol Consumption:  Social History     Substance and Sexual Activity   Alcohol Use Yes    Comment: 3-4 glasses of wine a week since March, prior was 3-4 times a year socially       Depression Screen:   PHQ-2/PHQ-9 Depression Screening 2020   Little interest or pleasure in doing things 0   Feeling down, depressed, or hopeless 0   Trouble falling or staying asleep, or sleeping too much -   Feeling tired or having little energy -   Poor appetite or overeating -   Feeling bad about yourself - or that you are a failure or have let yourself or your family down -   Trouble concentrating on things, such as reading the newspaper or watching television -   Moving or speaking so slowly that other people could have noticed. Or the opposite - being so fidgety or restless that you have been moving around a lot more than usual -   Thoughts that you would be  better off dead, or of hurting yourself in some way -   Total Score 0   If you checked off any problems, how difficult have these problems made it for you to do your work, take care of things at home, or get along with other people? -       Fall Risk Screen:  ANNAMARIE Fall Risk Assessment has not been completed.    Health Habits and Functional and Cognitive Screening:  Functional & Cognitive Status 11/12/2020   Do you have difficulty preparing food and eating? No   Do you have difficulty bathing yourself, getting dressed or grooming yourself? No   Do you have difficulty using the toilet? No   Do you have difficulty moving around from place to place? No   Do you have trouble with steps or getting out of a bed or a chair? No   Current Diet Well Balanced Diet   Dental Exam Not up to date   Eye Exam Up to date   Exercise (times per week) 6 times per week   Current Exercise Activities Include Walking   Do you need help using the phone?  No   Are you deaf or do you have serious difficulty hearing?  No   Do you need help with transportation? No   Do you need help shopping? No   Do you need help preparing meals?  No   Do you need help with housework?  No   Do you need help with laundry? No   Do you need help taking your medications? No   Do you need help managing money? No   Do you ever drive or ride in a car without wearing a seat belt? No   Have you felt unusual stress, anger or loneliness in the last month? No   Who do you live with? Alone   If you need help, do you have trouble finding someone available to you? No   Have you been bothered in the last four weeks by sexual problems? No   Do you have difficulty concentrating, remembering or making decisions? No         Does the patient have evidence of cognitive impairment? No    Asprin use counseling:Does not need ASA (and currently is not on it)    Age-appropriate Screening Schedule:  Refer to the list below for future screening recommendations based on patient's age, sex  and/or medical conditions. Orders for these recommended tests are listed in the plan section. The patient has been provided with a written plan.    Health Maintenance   Topic Date Due   • ZOSTER VACCINE (2 of 2) 01/03/2019   • MAMMOGRAM  11/14/2021   • COLONOSCOPY  07/12/2026   • TDAP/TD VACCINES (2 - Td) 05/01/2028   • INFLUENZA VACCINE  Completed          The following portions of the patient's history were reviewed and updated as appropriate: allergies, current medications, past family history, past medical history, past social history, past surgical history and problem list.    Outpatient Medications Prior to Visit   Medication Sig Dispense Refill   • acetic acid-hydrocortisone (VOSOL-HC) 1-2 % otic solution Administer 5 drops into both ears 3 (Three) Times a Day. 10 mL 2   • amLODIPine (NORVASC) 10 MG tablet Take 1 tablet by mouth Daily. 90 tablet 0   • Levocetirizine Dihydrochloride (XYZAL PO) Take  by mouth.     • Omega-3 Fatty Acids (fish oil) 1000 MG capsule capsule Take 2,000 mg by mouth Daily With Breakfast.       No facility-administered medications prior to visit.        Patient Active Problem List   Diagnosis   • HTN (hypertension)   • Obesity (BMI 30.0-34.9)   • Osteoarthritis of knee   • Allergic rhinitis   • BPPV (benign paroxysmal positional vertigo)       Advanced Care Planning:  ACP discussion was held with the patient during this visit. Patient does not have an advance directive, declines further assistance.    Review of Systems   Constitutional: Negative.    HENT: Negative.    Eyes: Positive for itching.        +dry eyes   Respiratory: Negative.    Cardiovascular: Negative.    Gastrointestinal: Negative.    Genitourinary: Negative.    Musculoskeletal: Negative.    Skin: Negative.    Allergic/Immunologic: Positive for environmental allergies.   Neurological: Negative.    Hematological: Negative.    Psychiatric/Behavioral: Negative.        Compared to one year ago, the patient feels her physical  "health is the same.  Compared to one year ago, the patient feels her mental health is worse. Due to pandemic, \"worst year of her life.\"    Reviewed chart for potential of high risk medication in the elderly: yes  Reviewed chart for potential of harmful drug interactions in the elderly:yes    Objective         Vitals:    11/12/20 1239   BP: 136/84   Temp: 97.5 °F (36.4 °C)   Weight: 92 kg (202 lb 12.8 oz)   Height: 172.7 cm (68\")   PainSc: 0-No pain       Body mass index is 30.84 kg/m².  Discussed the patient's BMI with her. The BMI is in the acceptable range.    Physical Exam  Vitals signs and nursing note reviewed.   Constitutional:       General: She is not in acute distress.     Appearance: Normal appearance. She is well-developed. She is not ill-appearing or diaphoretic.   HENT:      Head: Normocephalic and atraumatic.      Right Ear: Tympanic membrane, ear canal and external ear normal.      Left Ear: Tympanic membrane, ear canal and external ear normal.      Nose: Nose normal.      Mouth/Throat:      Comments: Wearing mask  Eyes:      General: No scleral icterus.     Conjunctiva/sclera: Conjunctivae normal.      Pupils: Pupils are equal, round, and reactive to light.   Neck:      Musculoskeletal: Neck supple.      Thyroid: No thyromegaly.   Cardiovascular:      Rate and Rhythm: Normal rate and regular rhythm.      Heart sounds: Normal heart sounds. No murmur.   Pulmonary:      Effort: Pulmonary effort is normal. No respiratory distress.      Breath sounds: Normal breath sounds.   Abdominal:      General: Bowel sounds are normal. There is no distension.      Palpations: Abdomen is soft.      Tenderness: There is no abdominal tenderness.   Musculoskeletal:         General: No deformity.      Right lower leg: No edema.      Left lower leg: No edema.   Lymphadenopathy:      Cervical: No cervical adenopathy.   Skin:     General: Skin is warm and dry.      Findings: No rash.   Neurological:      General: No focal " deficit present.      Mental Status: She is alert and oriented to person, place, and time. Mental status is at baseline.   Psychiatric:         Mood and Affect: Mood normal.         Behavior: Behavior normal.         Thought Content: Thought content normal.         Judgment: Judgment normal.               Assessment/Plan   Medicare Risks and Personalized Health Plan  CMS Preventative Services Quick Reference  Advance Directive Discussion  Breast Cancer/Mammogram Screening  Fall Risk  Immunizations Discussed/Encouraged (specific immunizations; Pneumococcal 23 and Shingrix )  Osteoprorosis Risk    The above risks/problems have been discussed with the patient.  Pertinent information has been shared with the patient in the After Visit Summary.  Follow up plans and orders are seen below in the Assessment/Plan Section.    Diagnoses and all orders for this visit:    Medicare annual wellness visit, subsequent    Essential hypertension  - BP well controlled on amlodipine 10mg daily, will continue.   - No evidence of edema on exam, given intermittent nature of edema do not suspect side effect of CCB.    Allergic rhinitis, unspecified seasonality, unspecified trigger  - Well controlled on xyzal    Other specified disorders of bone density and structure, other site  - DEXA 2013 normal. Will order repeat today.  - Encouraged her to continue weight bearing activity, start OTC vitamin D 4061-5061 units daily.    Health Maintenance  - Pap smear: No longer indicated  - Mammogram: 11/2019 [facility] 2. Scheduled 12/31.  - Colonoscopy: Hx of polyps, last 7/2016. Patient reports no polyps and no plan for repeat due to age.   - HCV: negative  - Immunizations: Flu UTD. Reports pneumovax, shingrix, hep A series complete at [facility]. Records requested.  - Depression screening: negative 11/2020     Follow Up:  Return in about 1 year (around 11/12/2021) for Medicare Wellness 45 minutes.     An After Visit Summary and PPPS were given to the  patient.

## 2020-12-31 ENCOUNTER — HOSPITAL ENCOUNTER (OUTPATIENT)
Dept: MAMMOGRAPHY | Facility: HOSPITAL | Age: 75
Discharge: HOME | End: 2020-12-31
Admitting: INTERNAL MEDICINE

## 2020-12-31 DIAGNOSIS — Z12.31 VISIT FOR SCREENING MAMMOGRAM: ICD-10-CM

## 2020-12-31 PROCEDURE — 77067 SCR MAMMO BI INCL CAD: CPT

## 2020-12-31 PROCEDURE — 77063 BREAST TOMOSYNTHESIS BI: CPT

## 2020-12-31 PROCEDURE — 77063 BREAST TOMOSYNTHESIS BI: CPT | Performed by: RADIOLOGY

## 2020-12-31 PROCEDURE — 77067 SCR MAMMO BI INCL CAD: CPT | Performed by: RADIOLOGY

## 2021-01-14 ENCOUNTER — HOSPITAL ENCOUNTER (OUTPATIENT)
Dept: MAMMOGRAPHY | Facility: HOSPITAL | Age: 76
Discharge: HOME | End: 2021-01-14
Admitting: RADIOLOGY

## 2021-01-14 DIAGNOSIS — R92.8 ABNORMAL MAMMOGRAM: ICD-10-CM

## 2021-01-14 PROCEDURE — 77065 DX MAMMO INCL CAD UNI: CPT | Performed by: RADIOLOGY

## 2021-01-14 PROCEDURE — G0279 TOMOSYNTHESIS, MAMMO: HCPCS | Performed by: RADIOLOGY

## 2021-01-14 PROCEDURE — G0279 TOMOSYNTHESIS, MAMMO: HCPCS

## 2021-01-14 PROCEDURE — 77065 DX MAMMO INCL CAD UNI: CPT

## 2021-03-23 ENCOUNTER — APPOINTMENT (OUTPATIENT)
Dept: BONE DENSITY | Facility: HOSPITAL | Age: 76
End: 2021-03-23

## 2021-06-28 DIAGNOSIS — Z12.11 SCREENING FOR COLON CANCER: Primary | ICD-10-CM

## 2021-07-29 ENCOUNTER — OUTSIDE FACILITY SERVICE (OUTPATIENT)
Dept: GASTROENTEROLOGY | Facility: CLINIC | Age: 76
End: 2021-07-29

## 2021-07-29 PROCEDURE — 45385 COLONOSCOPY W/LESION REMOVAL: CPT | Performed by: INTERNAL MEDICINE

## 2021-07-29 PROCEDURE — 88305 TISSUE EXAM BY PATHOLOGIST: CPT | Performed by: INTERNAL MEDICINE

## 2021-07-29 PROCEDURE — G0500 MOD SEDAT ENDO SERVICE >5YRS: HCPCS | Performed by: INTERNAL MEDICINE

## 2021-07-29 PROCEDURE — 45388 COLONOSCOPY W/ABLATION: CPT | Performed by: INTERNAL MEDICINE

## 2021-07-30 ENCOUNTER — LAB REQUISITION (OUTPATIENT)
Dept: LAB | Facility: HOSPITAL | Age: 76
End: 2021-07-30

## 2021-07-30 DIAGNOSIS — D12.4 BENIGN NEOPLASM OF DESCENDING COLON: ICD-10-CM

## 2021-07-30 DIAGNOSIS — Z12.11 ENCOUNTER FOR SCREENING FOR MALIGNANT NEOPLASM OF COLON: ICD-10-CM

## 2021-07-30 DIAGNOSIS — D12.0 BENIGN NEOPLASM OF CECUM: ICD-10-CM

## 2021-07-30 DIAGNOSIS — D12.2 BENIGN NEOPLASM OF ASCENDING COLON: ICD-10-CM

## 2021-07-30 DIAGNOSIS — Z86.010 PERSONAL HISTORY OF COLONIC POLYPS: ICD-10-CM

## 2021-07-30 DIAGNOSIS — K57.30 DIVERTICULOSIS OF LARGE INTESTINE WITHOUT PERFORATION OR ABSCESS WITHOUT BLEEDING: ICD-10-CM

## 2021-08-02 ENCOUNTER — TELEPHONE (OUTPATIENT)
Dept: GASTROENTEROLOGY | Facility: CLINIC | Age: 76
End: 2021-08-02

## 2021-08-02 LAB
CYTO UR: NORMAL
LAB AP CASE REPORT: NORMAL
LAB AP CLINICAL INFORMATION: NORMAL
PATH REPORT.FINAL DX SPEC: NORMAL
PATH REPORT.GROSS SPEC: NORMAL

## 2021-08-02 NOTE — TELEPHONE ENCOUNTER
I spoke with Ms Gaspar. Biopsy results given. Patient is agreeable to repeating Colonoscopy in 3 months per Dr Child.

## 2021-08-02 NOTE — TELEPHONE ENCOUNTER
----- Message from Gerhard Child MD sent at 8/2/2021  3:16 PM EDT -----  Please let Maryjane know that she had multiple adenomas.  None of them had high-grade dysplasia.  A follow-up exam to make sure that there is complete removal of them is recommended in 3 months time

## 2021-10-21 DIAGNOSIS — Z12.11 SCREENING FOR COLON CANCER: ICD-10-CM

## 2021-11-09 ENCOUNTER — OUTSIDE FACILITY SERVICE (OUTPATIENT)
Dept: GASTROENTEROLOGY | Facility: CLINIC | Age: 76
End: 2021-11-09

## 2021-11-09 PROCEDURE — G0500 MOD SEDAT ENDO SERVICE >5YRS: HCPCS | Performed by: INTERNAL MEDICINE

## 2021-11-09 PROCEDURE — 45388 COLONOSCOPY W/ABLATION: CPT | Performed by: INTERNAL MEDICINE

## 2021-11-09 PROCEDURE — 45385 COLONOSCOPY W/LESION REMOVAL: CPT | Performed by: INTERNAL MEDICINE

## 2021-11-09 PROCEDURE — 88305 TISSUE EXAM BY PATHOLOGIST: CPT | Performed by: INTERNAL MEDICINE

## 2021-11-10 ENCOUNTER — LAB REQUISITION (OUTPATIENT)
Dept: LAB | Facility: HOSPITAL | Age: 76
End: 2021-11-10

## 2021-11-10 DIAGNOSIS — Z12.11 ENCOUNTER FOR SCREENING FOR MALIGNANT NEOPLASM OF COLON: ICD-10-CM

## 2021-11-12 ENCOUNTER — TELEPHONE (OUTPATIENT)
Dept: GASTROENTEROLOGY | Facility: CLINIC | Age: 76
End: 2021-11-12

## 2021-11-12 NOTE — TELEPHONE ENCOUNTER
----- Message from Gerhard Child MD sent at 11/11/2021  4:12 PM EST -----  Please let Maryjane know that she had multiple adenomas.  There was no high-grade dysplasia.  She should follow-up in a years time

## 2021-11-16 ENCOUNTER — OFFICE VISIT (OUTPATIENT)
Dept: INTERNAL MEDICINE | Facility: CLINIC | Age: 76
End: 2021-11-16

## 2021-11-16 ENCOUNTER — LAB (OUTPATIENT)
Dept: LAB | Facility: HOSPITAL | Age: 76
End: 2021-11-16

## 2021-11-16 VITALS
OXYGEN SATURATION: 96 % | DIASTOLIC BLOOD PRESSURE: 80 MMHG | WEIGHT: 209.2 LBS | RESPIRATION RATE: 16 BRPM | BODY MASS INDEX: 31.71 KG/M2 | SYSTOLIC BLOOD PRESSURE: 130 MMHG | HEART RATE: 80 BPM | TEMPERATURE: 96.9 F | HEIGHT: 68 IN

## 2021-11-16 DIAGNOSIS — Z13.220 SCREENING, LIPID: ICD-10-CM

## 2021-11-16 DIAGNOSIS — M85.88 OTHER SPECIFIED DISORDERS OF BONE DENSITY AND STRUCTURE, OTHER SITE: ICD-10-CM

## 2021-11-16 DIAGNOSIS — I10 PRIMARY HYPERTENSION: ICD-10-CM

## 2021-11-16 DIAGNOSIS — Z00.00 MEDICARE ANNUAL WELLNESS VISIT, SUBSEQUENT: ICD-10-CM

## 2021-11-16 DIAGNOSIS — Z85.828 HISTORY OF BASAL CELL CARCINOMA: ICD-10-CM

## 2021-11-16 DIAGNOSIS — Z00.00 MEDICARE ANNUAL WELLNESS VISIT, SUBSEQUENT: Primary | ICD-10-CM

## 2021-11-16 DIAGNOSIS — D36.9 ADENOMATOUS POLYPS: ICD-10-CM

## 2021-11-16 DIAGNOSIS — J30.9 ALLERGIC RHINITIS, UNSPECIFIED SEASONALITY, UNSPECIFIED TRIGGER: ICD-10-CM

## 2021-11-16 LAB
ALBUMIN SERPL-MCNC: 4.8 G/DL (ref 3.5–5.2)
ALBUMIN/GLOB SERPL: 1.6 G/DL
ALP SERPL-CCNC: 109 U/L (ref 39–117)
ALT SERPL W P-5'-P-CCNC: 9 U/L (ref 1–33)
ANION GAP SERPL CALCULATED.3IONS-SCNC: 10.1 MMOL/L (ref 5–15)
AST SERPL-CCNC: 18 U/L (ref 1–32)
BILIRUB SERPL-MCNC: 0.4 MG/DL (ref 0–1.2)
BUN SERPL-MCNC: 9 MG/DL (ref 8–23)
BUN/CREAT SERPL: 11.1 (ref 7–25)
CALCIUM SPEC-SCNC: 10.1 MG/DL (ref 8.6–10.5)
CHLORIDE SERPL-SCNC: 103 MMOL/L (ref 98–107)
CHOLEST SERPL-MCNC: 193 MG/DL (ref 0–200)
CO2 SERPL-SCNC: 28.9 MMOL/L (ref 22–29)
CREAT SERPL-MCNC: 0.81 MG/DL (ref 0.57–1)
DEPRECATED RDW RBC AUTO: 42.5 FL (ref 37–54)
ERYTHROCYTE [DISTWIDTH] IN BLOOD BY AUTOMATED COUNT: 12.9 % (ref 12.3–15.4)
GFR SERPL CREATININE-BSD FRML MDRD: 84 ML/MIN/1.73
GLOBULIN UR ELPH-MCNC: 3 GM/DL
GLUCOSE SERPL-MCNC: 85 MG/DL (ref 65–99)
HCT VFR BLD AUTO: 44.7 % (ref 34–46.6)
HDLC SERPL-MCNC: 62 MG/DL (ref 40–60)
HGB BLD-MCNC: 14.1 G/DL (ref 12–15.9)
LDLC SERPL CALC-MCNC: 117 MG/DL (ref 0–100)
LDLC/HDLC SERPL: 1.86 {RATIO}
MCH RBC QN AUTO: 28.4 PG (ref 26.6–33)
MCHC RBC AUTO-ENTMCNC: 31.5 G/DL (ref 31.5–35.7)
MCV RBC AUTO: 89.9 FL (ref 79–97)
PLATELET # BLD AUTO: 381 10*3/MM3 (ref 140–450)
PMV BLD AUTO: 10.2 FL (ref 6–12)
POTASSIUM SERPL-SCNC: 3.5 MMOL/L (ref 3.5–5.2)
PROT SERPL-MCNC: 7.8 G/DL (ref 6–8.5)
RBC # BLD AUTO: 4.97 10*6/MM3 (ref 3.77–5.28)
SODIUM SERPL-SCNC: 142 MMOL/L (ref 136–145)
TRIGL SERPL-MCNC: 77 MG/DL (ref 0–150)
VLDLC SERPL-MCNC: 14 MG/DL (ref 5–40)
WBC # BLD AUTO: 6.06 10*3/MM3 (ref 3.4–10.8)

## 2021-11-16 PROCEDURE — 1126F AMNT PAIN NOTED NONE PRSNT: CPT | Performed by: INTERNAL MEDICINE

## 2021-11-16 PROCEDURE — 1170F FXNL STATUS ASSESSED: CPT | Performed by: INTERNAL MEDICINE

## 2021-11-16 PROCEDURE — 85027 COMPLETE CBC AUTOMATED: CPT

## 2021-11-16 PROCEDURE — 80061 LIPID PANEL: CPT

## 2021-11-16 PROCEDURE — 80053 COMPREHEN METABOLIC PANEL: CPT

## 2021-11-16 PROCEDURE — G0439 PPPS, SUBSEQ VISIT: HCPCS | Performed by: INTERNAL MEDICINE

## 2021-11-16 PROCEDURE — 1160F RVW MEDS BY RX/DR IN RCRD: CPT | Performed by: INTERNAL MEDICINE

## 2021-11-16 RX ORDER — CHOLECALCIFEROL (VITAMIN D3) 25 MCG
50 CAPSULE ORAL DAILY
COMMUNITY
End: 2021-11-16

## 2021-11-16 RX ORDER — MULTIVIT-MIN/IRON/FOLIC ACID/K 18-600-40
1 CAPSULE ORAL DAILY
COMMUNITY

## 2021-11-16 RX ORDER — AMLODIPINE BESYLATE 10 MG/1
10 TABLET ORAL DAILY
Qty: 90 TABLET | Refills: 3 | Status: SHIPPED | OUTPATIENT
Start: 2021-11-16 | End: 2022-12-09

## 2021-11-16 NOTE — PROGRESS NOTES
The ABCs of the Annual Wellness Visit  Subsequent Medicare Wellness Visit    Chief Complaint   Patient presents with   • Medicare Wellness-subsequent       Subjective   History of Present Illness:  Maryjane Gaspar is a 76 y.o. female who presents for a Subsequent Medicare Wellness Visit.    HEALTH RISK ASSESSMENT    Recent Hospitalizations:  No hospitalization(s) within the last year.    Current Medical Providers:  Patient Care Team:  Jesenia Uriostegui MD as PCP - General (Internal Medicine)    Smoking Status:  Social History     Tobacco Use   Smoking Status Former Smoker   • Packs/day: 0.25   • Years: 10.00   • Pack years: 2.50   • Types: Cigarettes   • Quit date:    • Years since quittin.9   Smokeless Tobacco Never Used   Tobacco Comment    4 cigarettes a day       Alcohol Consumption:  Social History     Substance and Sexual Activity   Alcohol Use Not Currently   • Alcohol/week: 0.0 standard drinks    Comment: 1 glass of wine every few months, mainly when in [place]       Depression Screen:   PHQ-2/PHQ-9 Depression Screening 2021   Little interest or pleasure in doing things 0   Feeling down, depressed, or hopeless 0   Trouble falling or staying asleep, or sleeping too much -   Feeling tired or having little energy -   Poor appetite or overeating -   Feeling bad about yourself - or that you are a failure or have let yourself or your family down -   Trouble concentrating on things, such as reading the newspaper or watching television -   Moving or speaking so slowly that other people could have noticed. Or the opposite - being so fidgety or restless that you have been moving around a lot more than usual -   Thoughts that you would be better off dead, or of hurting yourself in some way -   Total Score 0   If you checked off any problems, how difficult have these problems made it for you to do your work, take care of things at home, or get along with other people? -       Fall Risk Screen:  ANNAMARIE Fall  Risk Assessment was completed, and patient is at LOW risk for falls.Assessment completed on:11/16/2021    Health Habits and Functional and Cognitive Screening:  Functional & Cognitive Status 11/16/2021   Do you have difficulty preparing food and eating? No   Do you have difficulty bathing yourself, getting dressed or grooming yourself? No   Do you have difficulty using the toilet? No   Do you have difficulty moving around from place to place? No   Do you have trouble with steps or getting out of a bed or a chair? No   Current Diet Unhealthy Diet   Dental Exam Up to date   Eye Exam Up to date   Exercise (times per week) 3 times per week   Current Exercises Include Walking   Current Exercise Activities Include -   Do you need help using the phone?  No   Are you deaf or do you have serious difficulty hearing?  No   Do you need help with transportation? No   Do you need help shopping? No   Do you need help preparing meals?  No   Do you need help with housework?  No   Do you need help with laundry? No   Do you need help taking your medications? No   Do you need help managing money? No   Do you ever drive or ride in a car without wearing a seat belt? No   Have you felt unusual stress, anger or loneliness in the last month? No   Who do you live with? Alone   If you need help, do you have trouble finding someone available to you? No   Have you been bothered in the last four weeks by sexual problems? No   Do you have difficulty concentrating, remembering or making decisions? No         Does the patient have evidence of cognitive impairment? No    Asprin use counseling:Does not need ASA (and currently is not on it)    Age-appropriate Screening Schedule:  Refer to the list below for future screening recommendations based on patient's age, sex and/or medical conditions. Orders for these recommended tests are listed in the plan section. The patient has been provided with a written plan.    Health Maintenance   Topic Date Due   •  DXA SCAN  04/15/2015   • ZOSTER VACCINE (2 of 2) 01/03/2019   • INFLUENZA VACCINE  08/01/2021   • MAMMOGRAM  01/14/2022   • TDAP/TD VACCINES (2 - Td or Tdap) 05/01/2028          The following portions of the patient's history were reviewed and updated as appropriate: allergies, current medications, past family history, past medical history, past social history, past surgical history and problem list.    Outpatient Medications Prior to Visit   Medication Sig Dispense Refill   • amLODIPine (NORVASC) 10 MG tablet Take 1 tablet by mouth Daily. 90 tablet 3   • Artificial Tear Solution (Soothe XP) solution Apply  to eye(s) as directed by provider.     • Cholecalciferol (Vitamin D-3) 25 MCG (1000 UT) capsule Take 50 Units by mouth Daily.     • Levocetirizine Dihydrochloride (XYZAL PO) Take  by mouth.     • Omega-3 Fatty Acids (fish oil) 1000 MG capsule capsule Take 2,000 mg by mouth Daily With Breakfast.     • acetic acid-hydrocortisone (VOSOL-HC) 1-2 % otic solution Administer 5 drops into both ears 3 (Three) Times a Day. 10 mL 2   • Sod Picosulfate-Mag Ox-Cit Acd 10-3.5-12 MG-GM -GM/160ML solution Take 1 kit by mouth Take As Directed. Follow instructions mailed to your home. If you did not receive instructions; call [phone]. 320 mL 0     No facility-administered medications prior to visit.       Patient Active Problem List   Diagnosis   • HTN (hypertension)   • Obesity (BMI 30.0-34.9)   • Allergic rhinitis   • Adenomatous polyps       Advanced Care Planning:  ACP discussion was held with the patient during this visit. Patient does not have an advance directive, declines further assistance.    Review of Systems   Constitutional: Negative.    HENT: Negative for dental problem.    Eyes: Negative.    Respiratory: Negative.    Cardiovascular: Negative.    Gastrointestinal: Negative.    Genitourinary: Negative.    Musculoskeletal: Negative.    Skin: Positive for skin lesions (on back).   Allergic/Immunologic: Positive  "for environmental allergies.   Neurological: Negative.    Psychiatric/Behavioral: Negative.        Compared to one year ago, the patient feels her physical health is the same.  Compared to one year ago, the patient feels her mental health is the same.    Reviewed chart for potential of high risk medication in the elderly: yes  Reviewed chart for potential of harmful drug interactions in the elderly:yes    Objective         Vitals:    11/16/21 0954   BP: 130/80   Pulse: 80   Resp: 16   Temp: 96.9 °F (36.1 °C)   SpO2: 96%   Weight: 94.9 kg (209 lb 3.2 oz)   Height: 172.7 cm (67.99\")   PainSc: 0-No pain       Body mass index is 31.82 kg/m².  Discussed the patient's BMI with her. The BMI is above average; BMI management plan is completed.    Physical Exam  Vitals and nursing note reviewed.   Constitutional:       General: She is not in acute distress.     Appearance: Normal appearance. She is well-developed. She is not ill-appearing, toxic-appearing or diaphoretic.   HENT:      Head: Normocephalic and atraumatic.      Right Ear: Tympanic membrane, ear canal and external ear normal.      Left Ear: Tympanic membrane, ear canal and external ear normal.      Nose: Nose normal.      Mouth/Throat:      Mouth: Mucous membranes are moist.      Pharynx: Oropharynx is clear.   Eyes:      General: No scleral icterus.     Conjunctiva/sclera: Conjunctivae normal.   Cardiovascular:      Rate and Rhythm: Normal rate and regular rhythm.      Heart sounds: Normal heart sounds. No murmur heard.      Pulmonary:      Effort: Pulmonary effort is normal. No respiratory distress.      Breath sounds: Normal breath sounds.   Abdominal:      General: Bowel sounds are normal. There is no distension.      Palpations: Abdomen is soft. There is no mass.      Tenderness: There is no abdominal tenderness.   Musculoskeletal:         General: No deformity.      Cervical back: Neck supple.      Right lower leg: No edema.      Left lower leg: No edema. "   Lymphadenopathy:      Cervical: No cervical adenopathy.   Skin:     General: Skin is warm and dry.      Findings: Lesion (comedone on L mid back) present. No rash.      Comments: Prior basal cell excision site well healed   Neurological:      Mental Status: She is alert and oriented to person, place, and time. Mental status is at baseline.      Gait: Gait normal.   Psychiatric:         Mood and Affect: Mood normal.         Behavior: Behavior normal.         Thought Content: Thought content normal.         Judgment: Judgment normal.               Assessment/Plan   Medicare Risks and Personalized Health Plan  CMS Preventative Services Quick Reference  Advance Directive Discussion  Breast Cancer/Mammogram Screening  Colon Cancer Screening  Fall Risk  Immunizations Discussed/Encouraged (specific immunizations; Influenza, Pneumococcal 23, Shingrix and COVID19 )  Osteoporosis Risk    The above risks/problems have been discussed with the patient.  Pertinent information has been shared with the patient in the After Visit Summary.  Follow up plans and orders are seen below in the Assessment/Plan Section.    Diagnoses and all orders for this visit:    Medicare annual wellness visit, subsequent    Primary hypertension  - BP controlled, continue amlodipine 10mg daily.    Adenomatous polyps  - Hx of colon polyps, most recent colonoscopy 11/2021 with 11 polyps. Pathology tubular adenoma without high grade dysplasia.  - Repeat colonoscopy 11/2022    Allergic rhinitis, unspecified seasonality, unspecified trigger  - Well controlled on xyzal    History of basal cell carcinoma  - Brief skin exam today without concerning lesion. Recommend annual dermatology exam.    Other specified disorders of bone density and structure, other site  - DEXA 2013 normal. Will order repeat today.  - Encouraged her to continue weight bearing activity, continue vitamin D 2000 units daily.    Screening, lipid  - Lipid panel ordered     Health  Maintenance  - Pap smear: No longer indicated  - Mammogram: 1/2021 [facility] 2  - Colonoscopy: Hx of polyps, last 11/2021. Repeat 1y.  - HCV: negative  - Immunizations: Flu UTD. COVID complete, including booster. Reports pneumovax, shingrix, hep A series complete at [facility]. She will verify.  - Depression screening: negative 11/2021    Follow Up:  Return in about 1 year (around 11/16/2022) for Medicare Wellness, Labs today.     An After Visit Summary and PPPS were given to the patient.

## 2021-12-07 ENCOUNTER — TRANSCRIBE ORDERS (OUTPATIENT)
Dept: ADMINISTRATIVE | Facility: HOSPITAL | Age: 76
End: 2021-12-07

## 2021-12-07 DIAGNOSIS — Z13.9 SCREENING DUE: Primary | ICD-10-CM

## 2022-02-24 ENCOUNTER — APPOINTMENT (OUTPATIENT)
Dept: BONE DENSITY | Facility: HOSPITAL | Age: 77
End: 2022-02-24

## 2022-03-28 ENCOUNTER — HOSPITAL ENCOUNTER (OUTPATIENT)
Dept: BONE DENSITY | Facility: HOSPITAL | Age: 77
Discharge: HOME | End: 2022-03-28
Admitting: INTERNAL MEDICINE

## 2022-03-28 DIAGNOSIS — M85.88 OTHER SPECIFIED DISORDERS OF BONE DENSITY AND STRUCTURE, OTHER SITE: ICD-10-CM

## 2022-03-28 PROCEDURE — 77080 DXA BONE DENSITY AXIAL: CPT

## 2022-03-29 PROBLEM — M85.852 OSTEOPENIA OF NECK OF LEFT FEMUR: Status: ACTIVE | Noted: 2022-03-29

## 2022-08-11 ENCOUNTER — PATIENT MESSAGE (OUTPATIENT)
Dept: INTERNAL MEDICINE | Facility: CLINIC | Age: 77
End: 2022-08-11

## 2022-08-11 NOTE — TELEPHONE ENCOUNTER
From: Maryjane Gaspar  To: Jesenia Uriostegui MD  Sent: 8/11/2022 1:33 PM EDT  Subject: COVID Booster Vaccination    Please add my second COVID booster vaccination to my record. I received Pfizer [id number] on 4/20/2022.   Thank you

## 2022-12-09 DIAGNOSIS — I10 PRIMARY HYPERTENSION: ICD-10-CM

## 2022-12-09 RX ORDER — AMLODIPINE BESYLATE 10 MG/1
10 TABLET ORAL DAILY
Qty: 90 TABLET | Refills: 0 | Status: SHIPPED | OUTPATIENT
Start: 2022-12-09

## 2023-02-23 ENCOUNTER — PATIENT MESSAGE (OUTPATIENT)
Dept: INTERNAL MEDICINE | Facility: CLINIC | Age: 78
End: 2023-02-23
Payer: MEDICARE

## 2023-02-23 DIAGNOSIS — U07.1 COVID-19 VIRUS INFECTION: Primary | ICD-10-CM

## 2023-02-24 NOTE — TELEPHONE ENCOUNTER
Please call patient. She does not need an appointment but if she is interested in an antiviral I need to know date of symptom onset. Thanks.

## 2023-04-10 DIAGNOSIS — I10 PRIMARY HYPERTENSION: ICD-10-CM

## 2023-04-10 RX ORDER — AMLODIPINE BESYLATE 10 MG/1
10 TABLET ORAL DAILY
Qty: 90 TABLET | Refills: 3 | OUTPATIENT
Start: 2023-04-10

## 2023-04-13 ENCOUNTER — LAB (OUTPATIENT)
Dept: LAB | Facility: HOSPITAL | Age: 78
End: 2023-04-13
Payer: MEDICARE

## 2023-04-13 ENCOUNTER — OFFICE VISIT (OUTPATIENT)
Dept: INTERNAL MEDICINE | Facility: CLINIC | Age: 78
End: 2023-04-13
Payer: MEDICARE

## 2023-04-13 VITALS
HEIGHT: 67 IN | TEMPERATURE: 97.8 F | HEART RATE: 74 BPM | BODY MASS INDEX: 33.74 KG/M2 | DIASTOLIC BLOOD PRESSURE: 82 MMHG | WEIGHT: 215 LBS | OXYGEN SATURATION: 99 % | SYSTOLIC BLOOD PRESSURE: 140 MMHG

## 2023-04-13 DIAGNOSIS — D36.9 ADENOMATOUS POLYPS: ICD-10-CM

## 2023-04-13 DIAGNOSIS — Z00.00 HEALTHCARE MAINTENANCE: ICD-10-CM

## 2023-04-13 DIAGNOSIS — M85.852 OSTEOPENIA OF NECK OF LEFT FEMUR: ICD-10-CM

## 2023-04-13 DIAGNOSIS — Z13.220 SCREENING, LIPID: ICD-10-CM

## 2023-04-13 DIAGNOSIS — M85.852 OSTEOPENIA OF NECK OF LEFT FEMUR: Primary | ICD-10-CM

## 2023-04-13 DIAGNOSIS — I10 PRIMARY HYPERTENSION: ICD-10-CM

## 2023-04-13 LAB
ALBUMIN SERPL-MCNC: 4.6 G/DL (ref 3.5–5.2)
ALBUMIN/GLOB SERPL: 1.5 G/DL
ALP SERPL-CCNC: 104 U/L (ref 39–117)
ALT SERPL W P-5'-P-CCNC: 11 U/L (ref 1–33)
ANION GAP SERPL CALCULATED.3IONS-SCNC: 11.7 MMOL/L (ref 5–15)
AST SERPL-CCNC: 24 U/L (ref 1–32)
BILIRUB SERPL-MCNC: 0.3 MG/DL (ref 0–1.2)
BUN SERPL-MCNC: 13 MG/DL (ref 8–23)
BUN/CREAT SERPL: 14.1 (ref 7–25)
CALCIUM SPEC-SCNC: 10.4 MG/DL (ref 8.6–10.5)
CHLORIDE SERPL-SCNC: 104 MMOL/L (ref 98–107)
CHOLEST SERPL-MCNC: 190 MG/DL (ref 0–200)
CO2 SERPL-SCNC: 25.3 MMOL/L (ref 22–29)
CREAT SERPL-MCNC: 0.92 MG/DL (ref 0.57–1)
DEPRECATED RDW RBC AUTO: 41.3 FL (ref 37–54)
EGFRCR SERPLBLD CKD-EPI 2021: 64.3 ML/MIN/1.73
ERYTHROCYTE [DISTWIDTH] IN BLOOD BY AUTOMATED COUNT: 12.9 % (ref 12.3–15.4)
GLOBULIN UR ELPH-MCNC: 3.1 GM/DL
GLUCOSE SERPL-MCNC: 69 MG/DL (ref 65–99)
HCT VFR BLD AUTO: 40.3 % (ref 34–46.6)
HDLC SERPL-MCNC: 68 MG/DL (ref 40–60)
HGB BLD-MCNC: 13.5 G/DL (ref 12–15.9)
LDLC SERPL CALC-MCNC: 109 MG/DL (ref 0–100)
LDLC/HDLC SERPL: 1.59 {RATIO}
MCH RBC QN AUTO: 29.5 PG (ref 26.6–33)
MCHC RBC AUTO-ENTMCNC: 33.5 G/DL (ref 31.5–35.7)
MCV RBC AUTO: 88 FL (ref 79–97)
PLATELET # BLD AUTO: 321 10*3/MM3 (ref 140–450)
PMV BLD AUTO: 10.4 FL (ref 6–12)
POTASSIUM SERPL-SCNC: 4.5 MMOL/L (ref 3.5–5.2)
PROT SERPL-MCNC: 7.7 G/DL (ref 6–8.5)
RBC # BLD AUTO: 4.58 10*6/MM3 (ref 3.77–5.28)
SODIUM SERPL-SCNC: 141 MMOL/L (ref 136–145)
TRIGL SERPL-MCNC: 70 MG/DL (ref 0–150)
VLDLC SERPL-MCNC: 13 MG/DL (ref 5–40)
WBC NRBC COR # BLD: 6.82 10*3/MM3 (ref 3.4–10.8)

## 2023-04-13 PROCEDURE — 80061 LIPID PANEL: CPT

## 2023-04-13 PROCEDURE — 82306 VITAMIN D 25 HYDROXY: CPT

## 2023-04-13 PROCEDURE — 80053 COMPREHEN METABOLIC PANEL: CPT

## 2023-04-13 PROCEDURE — 85027 COMPLETE CBC AUTOMATED: CPT

## 2023-04-13 RX ORDER — AMLODIPINE BESYLATE 10 MG/1
10 TABLET ORAL DAILY
Qty: 90 TABLET | Refills: 3 | Status: SHIPPED | OUTPATIENT
Start: 2023-04-13

## 2023-04-13 NOTE — PROGRESS NOTES
"Internal Medicine Follow Up    Chief Complaint  Maryjane Gaspar is a 78 y.o. female who presents today for follow up of chronic medical conditions outlined below.    Chief Complaint   Patient presents with   • Hypertension   • Med Refill        HPI  Ms. Gaspar is here today for follow up on HTN. She is doing well. Has been travelling, took a 23d cruise to [place]. She has been monitoring BP at home twice daily and brings log. Readings at goal or near goal consistently. She needs amlodipine refilled. She had been confused on return for colonoscopy. She thought it was recommended to return in 5y however received appt last fall during her cruise which she cancelled.       Review of Systems  Review of Systems   Constitutional: Negative.    Respiratory: Negative.    Cardiovascular: Negative.         Current Medications  Current Outpatient Medications on File Prior to Visit   Medication Sig Dispense Refill   • Artificial Tear Solution (Soothe XP) solution Apply  to eye(s) as directed by provider.     • Cholecalciferol (Vitamin D) 50 MCG (2000 UT) capsule Take 1 capsule by mouth Daily.     • FIBER PO Take  by mouth.     • Levocetirizine Dihydrochloride (XYZAL PO) Take  by mouth.     • [DISCONTINUED] amLODIPine (NORVASC) 10 MG tablet TAKE 1 TABLET BY MOUTH  DAILY 90 tablet 0   • [DISCONTINUED] Omega-3 Fatty Acids (fish oil) 1000 MG capsule capsule Take 2,000 mg by mouth Daily With Breakfast.       No current facility-administered medications on file prior to visit.       Allergies  No Known Allergies    Objective  Visit Vitals  /82   Pulse 74   Temp 97.8 °F (36.6 °C)   Ht 170.2 cm (67\")   Wt 97.5 kg (215 lb)   SpO2 99%   BMI 33.67 kg/m²        Physical Exam  Physical Exam  Vitals and nursing note reviewed.   Constitutional:       General: She is not in acute distress.     Appearance: She is well-developed. She is not ill-appearing or toxic-appearing.   HENT:      Head: Normocephalic and atraumatic.   Eyes:      " Conjunctiva/sclera: Conjunctivae normal.   Cardiovascular:      Rate and Rhythm: Normal rate and regular rhythm.      Heart sounds: Normal heart sounds. No murmur heard.  Pulmonary:      Effort: Pulmonary effort is normal. No respiratory distress.      Breath sounds: Normal breath sounds.   Skin:     General: Skin is warm and dry.      Comments: Two blackheads on mid back   Neurological:      Mental Status: She is alert and oriented to person, place, and time. Mental status is at baseline.      Gait: Gait normal.   Psychiatric:         Mood and Affect: Mood normal.         Behavior: Behavior normal.         Thought Content: Thought content normal.         Judgment: Judgment normal.         Results  Results for orders placed or performed in visit on 11/16/21   CBC (No Diff)    Specimen: Blood   Result Value Ref Range    WBC 6.06 3.40 - 10.80 10*3/mm3    RBC 4.97 3.77 - 5.28 10*6/mm3    Hemoglobin 14.1 12.0 - 15.9 g/dL    Hematocrit 44.7 34.0 - 46.6 %    MCV 89.9 79.0 - 97.0 fL    MCH 28.4 26.6 - 33.0 pg    MCHC 31.5 31.5 - 35.7 g/dL    RDW 12.9 12.3 - 15.4 %    RDW-SD 42.5 37.0 - 54.0 fl    MPV 10.2 6.0 - 12.0 fL    Platelets 381 140 - 450 10*3/mm3   Comprehensive Metabolic Panel    Specimen: Blood   Result Value Ref Range    Glucose 85 65 - 99 mg/dL    BUN 9 8 - 23 mg/dL    Creatinine 0.81 0.57 - 1.00 mg/dL    Sodium 142 136 - 145 mmol/L    Potassium 3.5 3.5 - 5.2 mmol/L    Chloride 103 98 - 107 mmol/L    CO2 28.9 22.0 - 29.0 mmol/L    Calcium 10.1 8.6 - 10.5 mg/dL    Total Protein 7.8 6.0 - 8.5 g/dL    Albumin 4.80 3.50 - 5.20 g/dL    ALT (SGPT) 9 1 - 33 U/L    AST (SGOT) 18 1 - 32 U/L    Alkaline Phosphatase 109 39 - 117 U/L    Total Bilirubin 0.4 0.0 - 1.2 mg/dL    eGFR  African Amer 84 >60 mL/min/1.73    Globulin 3.0 gm/dL    A/G Ratio 1.6 g/dL    BUN/Creatinine Ratio 11.1 7.0 - 25.0    Anion Gap 10.1 5.0 - 15.0 mmol/L   Lipid Panel    Specimen: Blood   Result Value Ref Range    Total Cholesterol 193 0 - 200  mg/dL    Triglycerides 77 0 - 150 mg/dL    HDL Cholesterol 62 (H) 40 - 60 mg/dL    LDL Cholesterol  117 (H) 0 - 100 mg/dL    VLDL Cholesterol 14 5 - 40 mg/dL    LDL/HDL Ratio 1.86         Assessment and Plan  Diagnoses and all orders for this visit:    Osteopenia of neck of left femur  - DEXA 2022 with osteopenia, low FRAX  - on vitamin D, check level  - Next DEXA 2024    Primary hypertension  - BP controlled, continue amlodipine 10mg daily  - CMP today    Adenomatous polyps  - last cscope 11/2021 with 11 polyps, adenomas and sessile serrated adenomas  - discussed need for repeat and she will call    Screening, lipid  -     Lipid Panel; Future    Healthcare maintenance  -     CBC (No Diff); Future      Health Maintenance  - Pap smear: No longer indicated  - Mammogram: declines.  - Colonoscopy: Hx of polyps, last 11/2021. Repeat 1y.  - HCV: negative  - Immunizations: COVID UTD. Tdap 7/2018. Shingrix, pneumococcal complete.  - Depression screening: negative 4/2023       Return in about 6 months (around 10/13/2023) for Medicare Wellness 45 minutes, Labs today.

## 2023-04-14 LAB — 25(OH)D3 SERPL-MCNC: 34.4 NG/ML (ref 30–100)

## 2023-10-08 ENCOUNTER — PATIENT MESSAGE (OUTPATIENT)
Dept: INTERNAL MEDICINE | Facility: CLINIC | Age: 78
End: 2023-10-08
Payer: MEDICARE

## 2023-10-09 NOTE — TELEPHONE ENCOUNTER
From: Maryjane Gaspar  To: Jesenia Uriostegui  Sent: 10/8/2023 10:32 AM EDT  Subject: Vaccinations Update     Please add the following vaccinations to my record:  1) Pfizer advanced COVID booster- [id number] on 10/4/2023  2) Flu shot - [id number] on 10/4/2023    Thank you,  Maryjane Gaspar

## 2023-11-27 ENCOUNTER — PATIENT MESSAGE (OUTPATIENT)
Dept: INTERNAL MEDICINE | Facility: CLINIC | Age: 78
End: 2023-11-27
Payer: MEDICARE

## 2023-11-27 NOTE — TELEPHONE ENCOUNTER
From: Maryjane Gaspar  To: Jesenia Uriostegui  Sent: 11/27/2023 9:11 AM EST  Subject: Trigger Thumb    Good morning Dr. Uriostegui,  I hope you are safe and well and had a wonderful Thanksgiving holiday.     I am unable to flex my thumb or put any pressure on it without a lot of pain. There is also a hard mass the size of a pea at the base of my thumb.     I would appreciate your advice and/or referral to a hand doctor.     Thank you,  Maryjane Gaspar

## 2023-11-28 ENCOUNTER — OFFICE VISIT (OUTPATIENT)
Dept: INTERNAL MEDICINE | Facility: CLINIC | Age: 78
End: 2023-11-28
Payer: MEDICARE

## 2023-11-28 VITALS
SYSTOLIC BLOOD PRESSURE: 122 MMHG | HEIGHT: 67 IN | OXYGEN SATURATION: 100 % | HEART RATE: 83 BPM | DIASTOLIC BLOOD PRESSURE: 80 MMHG | BODY MASS INDEX: 33.67 KG/M2

## 2023-11-28 DIAGNOSIS — M79.644 THUMB PAIN, RIGHT: Primary | ICD-10-CM

## 2023-11-28 PROCEDURE — 3074F SYST BP LT 130 MM HG: CPT | Performed by: INTERNAL MEDICINE

## 2023-11-28 PROCEDURE — 3079F DIAST BP 80-89 MM HG: CPT | Performed by: INTERNAL MEDICINE

## 2023-11-28 PROCEDURE — 1159F MED LIST DOCD IN RCRD: CPT | Performed by: INTERNAL MEDICINE

## 2023-11-28 PROCEDURE — 1160F RVW MEDS BY RX/DR IN RCRD: CPT | Performed by: INTERNAL MEDICINE

## 2023-11-28 PROCEDURE — 99213 OFFICE O/P EST LOW 20 MIN: CPT | Performed by: INTERNAL MEDICINE

## 2023-11-28 NOTE — PROGRESS NOTES
"Subjective   Maryjane Gaspar is a 78 y.o. female.   Chief Complaint   Patient presents with    Hand Pain       History of Present Illness   Right thumb pain for few weeks. No trauma. Hurts when pressure is applied to thumb( grasping). No swelling. Wants to see hand specialist.   The following portions of the patient's history were reviewed and updated as appropriate: allergies, current medications, past family history, past medical history, past social history, past surgical history, and problem list.    Review of Systems   Constitutional:  Negative for fatigue and fever.   Musculoskeletal:         Thumb pain     /80   Pulse 83   Ht 170.2 cm (67\")   SpO2 100%   BMI 33.67 kg/m²     Objective   Physical Exam  Musculoskeletal:         General: Tenderness (right thumb) present. No swelling.         Assessment & Plan   Diagnoses and all orders for this visit:    1. Thumb pain, right (Primary)  -     Ambulatory Referral to Hand Surgery  Patient requests to see a hand specialist               "

## 2023-11-29 ENCOUNTER — OFFICE VISIT (OUTPATIENT)
Dept: ORTHOPEDIC SURGERY | Facility: CLINIC | Age: 78
End: 2023-11-29
Payer: MEDICARE

## 2023-11-29 VITALS — SYSTOLIC BLOOD PRESSURE: 126 MMHG | DIASTOLIC BLOOD PRESSURE: 84 MMHG | BODY MASS INDEX: 33.67 KG/M2 | HEIGHT: 67 IN

## 2023-11-29 DIAGNOSIS — M65.311 TRIGGER FINGER OF RIGHT THUMB: Primary | ICD-10-CM

## 2023-11-29 RX ORDER — TRIAMCINOLONE ACETONIDE 40 MG/ML
20 INJECTION, SUSPENSION INTRA-ARTICULAR; INTRAMUSCULAR
Status: COMPLETED | OUTPATIENT
Start: 2023-11-29 | End: 2023-11-29

## 2023-11-29 RX ORDER — LIDOCAINE HYDROCHLORIDE 10 MG/ML
0.5 INJECTION, SOLUTION EPIDURAL; INFILTRATION; INTRACAUDAL; PERINEURAL
Status: COMPLETED | OUTPATIENT
Start: 2023-11-29 | End: 2023-11-29

## 2023-11-29 RX ADMIN — TRIAMCINOLONE ACETONIDE 20 MG: 40 INJECTION, SUSPENSION INTRA-ARTICULAR; INTRAMUSCULAR at 13:51

## 2023-11-29 RX ADMIN — LIDOCAINE HYDROCHLORIDE 0.5 ML: 10 INJECTION, SOLUTION EPIDURAL; INFILTRATION; INTRACAUDAL; PERINEURAL at 13:51

## 2023-11-29 NOTE — PROGRESS NOTES
[facility]     Office Visit       Date: 11/29/2023   Patient Name: Maryjane Gaspar  MRN: [id number]  YOB: 1945    Referring Physician: Apple Degroot DO     Chief Complaint:   Chief Complaint   Patient presents with    Right Thumb - Pain       History of Present Illness:   Maryjane Gaspar is a 78 y.o. female right HD presents with pain of the right thumb for approximately 5 weeks.  The patient reports catching of the finger with occasional locking. The pain is worse with use and activity.  The patient has not received  corticosteroid injection(s) in the past.    Relevant medical history includes hypertension.  Patient is retired.  Denies smoking..           Subjective   Review of Systems:   Review of Systems   Constitutional: Negative.  Negative for chills, fatigue and fever.   HENT: Negative.  Negative for congestion and dental problem.    Eyes: Negative.  Negative for blurred vision.   Respiratory: Negative.  Negative for shortness of breath.    Cardiovascular: Negative.  Negative for leg swelling.   Gastrointestinal: Negative.  Negative for abdominal pain.   Endocrine: Negative.  Negative for polyuria.   Genitourinary: Negative.  Negative for difficulty urinating.   Musculoskeletal:  Positive for arthralgias.   Skin: Negative.    Allergic/Immunologic: Negative.    Neurological: Negative.    Hematological: Negative.  Negative for adenopathy.   Psychiatric/Behavioral: Negative.  Negative for behavioral problems.         Pertinent review of systems per HPI.     I reviewed the patient's chief complaint, history of present illness, review of systems, past medical history, surgical history, family history, social history, medications and allergy list in the EMR on 11/29/2023 and agree with the findings above.    Objective    Vital Signs:   Vitals:    11/29/23 1337   BP: 126/84   Weight: Comment: patient declined to get  "weight   Height: 170.2 cm (67\")     BMI: BMI is >= 30 and <35. (Class 1 Obesity). The following options were offered after discussion;: weight loss educational material (shared in after visit summary)       General Appearance: No acute distress. Alert and oriented.     Chest:  Non-labored breathing on room air. Regular rate and rhythm.    Right upper Extremity Exam:    No palpable masses or visible lesions  Fingers are warm, well-perfused with appropriate capillary refill.  Palpable radial pulse.    Sensation intact to light touch in median, radial and ulnar nerve distributions.    Motor- Fires FPL, ulnar intrinsics, EPL/EDC w/ full active and passive range of motion. Strength intact.    Non-tender except for in the areas highlighted below    Tender to palpation at a1 pulley of the right thumb with a palpable nodule.  There is  catching without locking with flexion of the digit.      Imaging/Studies:   Imaging Results (Last 24 Hours)       ** No results found for the last 24 hours. **            No imaging    Procedures:  Procedures    Quality Measures:   ACP:   ACP discussion was declined by the patient. Patient does not have an advance directive, declines further assistance.    Tobacco:   Maryjane Gaspar  reports that she quit smoking about 39 years ago. Her smoking use included cigarettes. She started smoking about 49 years ago. She has a 2.50 pack-year smoking history. She has never used smokeless tobacco..     Assessment / Plan    Assessment/Plan:     There are no diagnoses linked to this encounter.     Maryjane Garcia a 78 y.o. female who presents with:      ICD-10-CM ICD-9-CM   1. Trigger finger of right thumb  M65.311 727.03         Regarding the patient's trigger finger(s), I discussed the pathophysiology of the patient's diagnosis as well as various treatment options including corticosteroid injection, surgical release and night-time splints and anti-inflammatories.  The patient would like to proceed " with corticosteroid injection.    Procedure Note:    I discussed with the patient the potential benefits of performing therapeutic aspiration and injections as well as potential risks including but not limited to infection, swelling, pain, bleeding, bruising, nerve/vessel damage, skin color changes, transient elevation in blood glucose levels, and fat atrophy. After informed consent and after the areas were prepped with chlorhexadine soap, ethyl chloride was used to numb the skin. 0.5 mL of 1% lidocaine was injected followed by injection of 20mg of Kenalog into the A1 pulley of the right thumb.  The patient tolerated the procedure well. There were no complications. A sterile dressing was placed over the injection sites.        Follow Up:   No follow-ups on file.        Amol Smith MD  [facility] Hand and Upper Extremity Surgeon

## 2023-11-29 NOTE — PROGRESS NOTES
Procedure   - Hand/Upper Extremity Injection: R thumb A1 for trigger finger on 11/29/2023 1:51 PM  Indications: pain  Details: 21 G needle, volar approach  Medications: 0.5 mL lidocaine PF 1% 1 %; 20 mg triamcinolone acetonide 40 MG/ML  Outcome: tolerated well, no immediate complications  Procedure, treatment alternatives, risks and benefits explained, specific risks discussed. Consent was given by the patient. Immediately prior to procedure a time out was called to verify the correct patient, procedure, equipment, support staff and site/side marked as required. Patient was prepped and draped in the usual sterile fashion.

## 2024-02-01 DIAGNOSIS — I10 PRIMARY HYPERTENSION: ICD-10-CM

## 2024-02-01 RX ORDER — AMLODIPINE BESYLATE 10 MG/1
10 TABLET ORAL DAILY
Qty: 90 TABLET | Refills: 0 | Status: SHIPPED | OUTPATIENT
Start: 2024-02-01

## 2024-04-23 DIAGNOSIS — I10 PRIMARY HYPERTENSION: ICD-10-CM

## 2024-04-23 RX ORDER — AMLODIPINE BESYLATE 10 MG/1
10 TABLET ORAL DAILY
Qty: 90 TABLET | Refills: 3 | OUTPATIENT
Start: 2024-04-23

## 2024-04-23 NOTE — TELEPHONE ENCOUNTER
Patient scheduled May 23 for med refill appointment. Patient states she has enough to last her till the appointment.

## 2024-05-31 ENCOUNTER — OFFICE VISIT (OUTPATIENT)
Dept: INTERNAL MEDICINE | Facility: CLINIC | Age: 79
End: 2024-05-31
Payer: MEDICARE

## 2024-05-31 ENCOUNTER — LAB (OUTPATIENT)
Dept: LAB | Facility: HOSPITAL | Age: 79
End: 2024-05-31
Payer: MEDICARE

## 2024-05-31 VITALS
DIASTOLIC BLOOD PRESSURE: 82 MMHG | HEART RATE: 82 BPM | BODY MASS INDEX: 33.12 KG/M2 | HEIGHT: 67 IN | SYSTOLIC BLOOD PRESSURE: 140 MMHG | RESPIRATION RATE: 18 BRPM | OXYGEN SATURATION: 99 % | WEIGHT: 211 LBS

## 2024-05-31 DIAGNOSIS — E78.00 PURE HYPERCHOLESTEROLEMIA: ICD-10-CM

## 2024-05-31 DIAGNOSIS — I10 PRIMARY HYPERTENSION: ICD-10-CM

## 2024-05-31 DIAGNOSIS — I10 PRIMARY HYPERTENSION: Primary | ICD-10-CM

## 2024-05-31 DIAGNOSIS — Z00.00 HEALTHCARE MAINTENANCE: ICD-10-CM

## 2024-05-31 DIAGNOSIS — D36.9 ADENOMATOUS POLYPS: ICD-10-CM

## 2024-05-31 DIAGNOSIS — M85.852 OSTEOPENIA OF NECK OF LEFT FEMUR: ICD-10-CM

## 2024-05-31 DIAGNOSIS — R60.0 BILATERAL LOWER EXTREMITY EDEMA: ICD-10-CM

## 2024-05-31 LAB
25(OH)D3 SERPL-MCNC: 32.7 NG/ML (ref 30–100)
DEPRECATED RDW RBC AUTO: 40.9 FL (ref 37–54)
ERYTHROCYTE [DISTWIDTH] IN BLOOD BY AUTOMATED COUNT: 12.8 % (ref 12.3–15.4)
HCT VFR BLD AUTO: 41.4 % (ref 34–46.6)
HGB BLD-MCNC: 13.5 G/DL (ref 12–15.9)
MCH RBC QN AUTO: 29 PG (ref 26.6–33)
MCHC RBC AUTO-ENTMCNC: 32.6 G/DL (ref 31.5–35.7)
MCV RBC AUTO: 89 FL (ref 79–97)
PLATELET # BLD AUTO: 348 10*3/MM3 (ref 140–450)
PMV BLD AUTO: 10.9 FL (ref 6–12)
RBC # BLD AUTO: 4.65 10*6/MM3 (ref 3.77–5.28)
WBC NRBC COR # BLD AUTO: 7.1 10*3/MM3 (ref 3.4–10.8)

## 2024-05-31 PROCEDURE — 3079F DIAST BP 80-89 MM HG: CPT | Performed by: INTERNAL MEDICINE

## 2024-05-31 PROCEDURE — 1160F RVW MEDS BY RX/DR IN RCRD: CPT | Performed by: INTERNAL MEDICINE

## 2024-05-31 PROCEDURE — G2211 COMPLEX E/M VISIT ADD ON: HCPCS | Performed by: INTERNAL MEDICINE

## 2024-05-31 PROCEDURE — 3077F SYST BP >= 140 MM HG: CPT | Performed by: INTERNAL MEDICINE

## 2024-05-31 PROCEDURE — 82306 VITAMIN D 25 HYDROXY: CPT

## 2024-05-31 PROCEDURE — 1159F MED LIST DOCD IN RCRD: CPT | Performed by: INTERNAL MEDICINE

## 2024-05-31 PROCEDURE — 85027 COMPLETE CBC AUTOMATED: CPT

## 2024-05-31 PROCEDURE — 1126F AMNT PAIN NOTED NONE PRSNT: CPT | Performed by: INTERNAL MEDICINE

## 2024-05-31 PROCEDURE — 99214 OFFICE O/P EST MOD 30 MIN: CPT | Performed by: INTERNAL MEDICINE

## 2024-05-31 PROCEDURE — 80061 LIPID PANEL: CPT

## 2024-05-31 PROCEDURE — 80053 COMPREHEN METABOLIC PANEL: CPT

## 2024-05-31 RX ORDER — LOSARTAN POTASSIUM 25 MG/1
25 TABLET ORAL DAILY
Qty: 90 TABLET | Refills: 1 | Status: SHIPPED | OUTPATIENT
Start: 2024-05-31 | End: 2024-06-03

## 2024-05-31 RX ORDER — AMLODIPINE BESYLATE 10 MG/1
10 TABLET ORAL DAILY
Qty: 90 TABLET | Refills: 1 | Status: CANCELLED | OUTPATIENT
Start: 2024-05-31

## 2024-05-31 NOTE — PROGRESS NOTES
Internal Medicine Follow Up    Chief Complaint  Maryjane Gaspar is a 79 y.o. female who presents today for follow up of chronic medical conditions outlined below.    Chief Complaint   Patient presents with    Med Refill     Pt c/o ankle and feet swelling and needing refill    Joint Swelling        HPI  Ms. Gaspar is here today for medication refills. Last here over a year ago. She is doing well. Has been on several [place] cruises. She notes that she has not been monitoring BP at home like she was previously. She has had intermittent edema in both feet. Did not improve with low salt diet or compression socks PRN. She has not yet returned for colonoscopy but will consider it this fall after her next cruise. She is not sure she wants to continue colonoscopies.     Hypertension  This is a chronic problem. The current episode started more than 1 year ago. The problem has been stable since onset. The problem is controlled. Associated symptoms include peripheral edema. Pertinent negatives include no anxiety, blurred vision, chest pain, headaches, malaise/fatigue, orthopnea, palpitations or shortness of breath. There are no associated agents to hypertension. Risk factors for coronary artery disease include obesity and sedentary lifestyle. Compliance problems include diet and exercise.         Review of Systems  Review of Systems   Constitutional: Negative.  Negative for malaise/fatigue.   Eyes:  Negative for blurred vision.   Respiratory:  Negative for shortness of breath.    Cardiovascular:  Positive for leg swelling. Negative for chest pain, palpitations and orthopnea.   Genitourinary:  Negative for decreased urine volume.   Psychiatric/Behavioral: Negative.          Current Medications  Current Outpatient Medications on File Prior to Visit   Medication Sig Dispense Refill    amLODIPine (NORVASC) 10 MG tablet TAKE 1 TABLET BY MOUTH DAILY 90 tablet 0    Artificial Tear Solution (Soothe XP) solution Apply  to eye(s) as  "directed by provider.      FIBER PO Take  by mouth.      Levocetirizine Dihydrochloride (XYZAL PO) Take  by mouth.       No current facility-administered medications on file prior to visit.       Allergies  No Known Allergies    Objective  Visit Vitals  /82   Pulse 82   Resp 18   Ht 170.2 cm (67\")   Wt 95.7 kg (211 lb)   SpO2 99%   BMI 33.05 kg/m²        Physical Exam  Physical Exam  Vitals and nursing note reviewed.   Constitutional:       General: She is not in acute distress.     Appearance: She is well-developed. She is obese. She is not ill-appearing or toxic-appearing.   HENT:      Head: Normocephalic and atraumatic.   Eyes:      Conjunctiva/sclera: Conjunctivae normal.   Cardiovascular:      Rate and Rhythm: Normal rate and regular rhythm.      Heart sounds: Normal heart sounds. No murmur heard.  Pulmonary:      Effort: Pulmonary effort is normal. No respiratory distress.      Breath sounds: Normal breath sounds.   Musculoskeletal:      Right lower leg: No edema.      Left lower leg: No edema.   Skin:     General: Skin is warm and dry.   Neurological:      Mental Status: She is alert and oriented to person, place, and time. Mental status is at baseline.      Gait: Gait normal.   Psychiatric:         Mood and Affect: Mood normal.         Behavior: Behavior normal.         Thought Content: Thought content normal.         Judgment: Judgment normal.         Results  Results for orders placed or performed in visit on 04/13/23   CBC (No Diff)    Specimen: Blood   Result Value Ref Range    WBC 6.82 3.40 - 10.80 10*3/mm3    RBC 4.58 3.77 - 5.28 10*6/mm3    Hemoglobin 13.5 12.0 - 15.9 g/dL    Hematocrit 40.3 34.0 - 46.6 %    MCV 88.0 79.0 - 97.0 fL    MCH 29.5 26.6 - 33.0 pg    MCHC 33.5 31.5 - 35.7 g/dL    RDW 12.9 12.3 - 15.4 %    RDW-SD 41.3 37.0 - 54.0 fl    MPV 10.4 6.0 - 12.0 fL    Platelets 321 140 - 450 10*3/mm3   Comprehensive Metabolic Panel    Specimen: Blood   Result Value Ref Range    Glucose 69 65 " - 99 mg/dL    BUN 13 8 - 23 mg/dL    Creatinine 0.92 0.57 - 1.00 mg/dL    Sodium 141 136 - 145 mmol/L    Potassium 4.5 3.5 - 5.2 mmol/L    Chloride 104 98 - 107 mmol/L    CO2 25.3 22.0 - 29.0 mmol/L    Calcium 10.4 8.6 - 10.5 mg/dL    Total Protein 7.7 6.0 - 8.5 g/dL    Albumin 4.6 3.5 - 5.2 g/dL    ALT (SGPT) 11 1 - 33 U/L    AST (SGOT) 24 1 - 32 U/L    Alkaline Phosphatase 104 39 - 117 U/L    Total Bilirubin 0.3 0.0 - 1.2 mg/dL    Globulin 3.1 gm/dL    A/G Ratio 1.5 g/dL    BUN/Creatinine Ratio 14.1 7.0 - 25.0    Anion Gap 11.7 5.0 - 15.0 mmol/L    eGFR 64.3 >60.0 mL/min/1.73   Lipid Panel    Specimen: Blood   Result Value Ref Range    Total Cholesterol 190 0 - 200 mg/dL    Triglycerides 70 0 - 150 mg/dL    HDL Cholesterol 68 (H) 40 - 60 mg/dL    LDL Cholesterol  109 (H) 0 - 100 mg/dL    VLDL Cholesterol 13 5 - 40 mg/dL    LDL/HDL Ratio 1.59    Vitamin D,25-Hydroxy    Specimen: Blood   Result Value Ref Range    25 Hydroxy, Vitamin D 34.4 30.0 - 100.0 ng/ml        Assessment and Plan  Diagnoses and all orders for this visit:    Primary hypertension  - experiencing intermittent edema which may be due to amlodipine. Will discontinued.  - start losartan 25mg daily. Can call if BP >130/80 to increase to 50mg daily.  - CMP today    Adenomatous polyps  - last cscope 11/2021 with 11 polyps, adenomas and sessile serrated adenomas  - discussed need for repeat and she will consider in the fall    Osteopenia of neck of left femur  - DEXA 2022 with osteopenia, low FRAX  - on vitamin D, check level  - DEXA ordered    Pure hypercholesterolemia  - update lipid panel    Bilateral lower extremity edema  - continue low salt diet and compression socks  - switching off amlodipine in case it is contributing    Healthcare maintenance  -     CBC (No Diff); Future     Health Maintenance  - Pap smear: No longer indicated  - Mammogram: declines.  - Colonoscopy: Hx of polyps, last 11/2021. Repeat 1y. Discussed again today.  - HCV:  negative  - Immunizations: COVID and RSV due. Tdap 7/2018. Shingrix, pneumococcal complete.  - Depression screening: negative 4/2023    Return in about 3 months (around 8/31/2024) for Follow up HTN, Labs today. Medicare wellness 45 minutes for 12/2024..

## 2024-06-01 ENCOUNTER — PATIENT MESSAGE (OUTPATIENT)
Dept: INTERNAL MEDICINE | Facility: CLINIC | Age: 79
End: 2024-06-01
Payer: MEDICARE

## 2024-06-01 DIAGNOSIS — I10 PRIMARY HYPERTENSION: Primary | Chronic | ICD-10-CM

## 2024-06-01 LAB
ALBUMIN SERPL-MCNC: 4.5 G/DL (ref 3.5–5.2)
ALBUMIN/GLOB SERPL: 1.6 G/DL
ALP SERPL-CCNC: 99 U/L (ref 39–117)
ALT SERPL W P-5'-P-CCNC: 14 U/L (ref 1–33)
ANION GAP SERPL CALCULATED.3IONS-SCNC: 13.3 MMOL/L (ref 5–15)
AST SERPL-CCNC: 23 U/L (ref 1–32)
BILIRUB SERPL-MCNC: 0.3 MG/DL (ref 0–1.2)
BUN SERPL-MCNC: 13 MG/DL (ref 8–23)
BUN/CREAT SERPL: 13.7 (ref 7–25)
CALCIUM SPEC-SCNC: 9.7 MG/DL (ref 8.6–10.5)
CHLORIDE SERPL-SCNC: 103 MMOL/L (ref 98–107)
CHOLEST SERPL-MCNC: 183 MG/DL (ref 0–200)
CO2 SERPL-SCNC: 23.7 MMOL/L (ref 22–29)
CREAT SERPL-MCNC: 0.95 MG/DL (ref 0.57–1)
EGFRCR SERPLBLD CKD-EPI 2021: 61.5 ML/MIN/1.73
GLOBULIN UR ELPH-MCNC: 2.8 GM/DL
GLUCOSE SERPL-MCNC: 80 MG/DL (ref 65–99)
HDLC SERPL-MCNC: 78 MG/DL (ref 40–60)
LDLC SERPL CALC-MCNC: 93 MG/DL (ref 0–100)
LDLC/HDLC SERPL: 1.18 {RATIO}
POTASSIUM SERPL-SCNC: 3.8 MMOL/L (ref 3.5–5.2)
PROT SERPL-MCNC: 7.3 G/DL (ref 6–8.5)
SODIUM SERPL-SCNC: 140 MMOL/L (ref 136–145)
TRIGL SERPL-MCNC: 64 MG/DL (ref 0–150)
VLDLC SERPL-MCNC: 12 MG/DL (ref 5–40)

## 2024-06-03 RX ORDER — AMLODIPINE BESYLATE 10 MG/1
10 TABLET ORAL DAILY
Qty: 90 TABLET | Refills: 3 | Status: SHIPPED | OUTPATIENT
Start: 2024-06-03

## 2024-08-22 ENCOUNTER — TELEPHONE (OUTPATIENT)
Dept: BEHAVIORAL HEALTH | Facility: CLINIC | Age: 79
End: 2024-08-22
Payer: MEDICARE

## 2024-08-22 ENCOUNTER — HOSPITAL ENCOUNTER (OUTPATIENT)
Dept: BONE DENSITY | Facility: HOSPITAL | Age: 79
Discharge: HOME | End: 2024-08-22
Admitting: INTERNAL MEDICINE
Payer: MEDICARE

## 2024-08-22 DIAGNOSIS — M85.852 OSTEOPENIA OF NECK OF LEFT FEMUR: ICD-10-CM

## 2024-08-22 PROCEDURE — 77080 DXA BONE DENSITY AXIAL: CPT

## 2024-08-22 NOTE — TELEPHONE ENCOUNTER
Spoke with pt relayed results, pt gave verbal understanding, pt advised she would get otc supplement

## 2024-08-22 NOTE — TELEPHONE ENCOUNTER
----- Message from Apple Degroot sent at 8/22/2024  3:04 PM EDT -----  Bone density showed osteopenia. 1,000 IU vitamin d 3 daily. Daily weight bearing exercises such as walking.

## 2024-08-26 NOTE — PROGRESS NOTES
"     Follow Up Office Visit      Date: 2024  Patient Name: Maryjane Gaspar  : 1945   MRN: [id number]     Chief Complaint:    Chief Complaint   Patient presents with    3m Followup     Hypertension       History of Present Illness: Maryjane Gaspar is a 79 y.o. female who is here today for follow up with hypertension. Patient reports she has been checking her blood pressure twice daily. Morning readings around 117 systolic and evening readings around 123 systolic.  Patient reports she has been doing well overall since she was last seen.  She recently returned from a trip to [facility] and [place] and is agreeable for colonoscopy. Patient has no other complaints today.    Subjective      Review of Systems:   Review of Systems   Constitutional:  Negative for chills and fever.   Respiratory:  Negative for shortness of breath.    Cardiovascular:  Negative for chest pain, palpitations and leg swelling.   Gastrointestinal:  Negative for abdominal pain.   Neurological:  Negative for facial asymmetry and headache.       Medications:     Current Outpatient Medications:     amLODIPine (NORVASC) 10 MG tablet, Take 1 tablet by mouth Daily., Disp: 90 tablet, Rfl: 3    Artificial Tear Solution (Soothe XP) solution, Apply  to eye(s) as directed by provider., Disp: , Rfl:     Cholecalciferol 25 MCG (1000 UT) tablet, Take 1 tablet by mouth Daily., Disp: , Rfl:     FIBER PO, Take  by mouth., Disp: , Rfl:     Levocetirizine Dihydrochloride (XYZAL PO), Take  by mouth., Disp: , Rfl:     Allergies:   No Known Allergies    Objective     Physical Exam:  Vital Signs:   Vitals:    24 0910   BP: 130/88   Pulse: 84   Resp: 18   Temp: 96.9 °F (36.1 °C)   TempSrc: Temporal   SpO2: 97%   Weight: 94.3 kg (208 lb)   Height: 170.2 cm (67.01\")   PainSc: 0-No pain     Body mass index is 32.57 kg/m².   Facility age limit for growth %dolores is 20 years.          Physical Exam  Vitals and nursing note reviewed.   Constitutional:       " General: She is not in acute distress.     Appearance: Normal appearance.   Eyes:      Extraocular Movements: Extraocular movements intact.      Conjunctiva/sclera: Conjunctivae normal.   Pulmonary:      Effort: Pulmonary effort is normal. No respiratory distress.   Neurological:      General: No focal deficit present.      Mental Status: She is alert and oriented to person, place, and time. Mental status is at baseline.   Psychiatric:         Mood and Affect: Mood normal.         Behavior: Behavior normal.         POCT Results (if applicable):   Results for orders placed or performed in visit on 05/31/24   CBC (No Diff)    Specimen: Blood   Result Value Ref Range    WBC 7.10 3.40 - 10.80 10*3/mm3    RBC 4.65 3.77 - 5.28 10*6/mm3    Hemoglobin 13.5 12.0 - 15.9 g/dL    Hematocrit 41.4 34.0 - 46.6 %    MCV 89.0 79.0 - 97.0 fL    MCH 29.0 26.6 - 33.0 pg    MCHC 32.6 31.5 - 35.7 g/dL    RDW 12.8 12.3 - 15.4 %    RDW-SD 40.9 37.0 - 54.0 fl    MPV 10.9 6.0 - 12.0 fL    Platelets 348 140 - 450 10*3/mm3   Comprehensive Metabolic Panel    Specimen: Blood   Result Value Ref Range    Glucose 80 65 - 99 mg/dL    BUN 13 8 - 23 mg/dL    Creatinine 0.95 0.57 - 1.00 mg/dL    Sodium 140 136 - 145 mmol/L    Potassium 3.8 3.5 - 5.2 mmol/L    Chloride 103 98 - 107 mmol/L    CO2 23.7 22.0 - 29.0 mmol/L    Calcium 9.7 8.6 - 10.5 mg/dL    Total Protein 7.3 6.0 - 8.5 g/dL    Albumin 4.5 3.5 - 5.2 g/dL    ALT (SGPT) 14 1 - 33 U/L    AST (SGOT) 23 1 - 32 U/L    Alkaline Phosphatase 99 39 - 117 U/L    Total Bilirubin 0.3 0.0 - 1.2 mg/dL    Globulin 2.8 gm/dL    A/G Ratio 1.6 g/dL    BUN/Creatinine Ratio 13.7 7.0 - 25.0    Anion Gap 13.3 5.0 - 15.0 mmol/L    eGFR 61.5 >60.0 mL/min/1.73   Lipid Panel    Specimen: Blood   Result Value Ref Range    Total Cholesterol 183 0 - 200 mg/dL    Triglycerides 64 0 - 150 mg/dL    HDL Cholesterol 78 (H) 40 - 60 mg/dL    LDL Cholesterol  93 0 - 100 mg/dL    VLDL Cholesterol 12 5 - 40 mg/dL    LDL/HDL Ratio  1.18    Vitamin D,25-Hydroxy    Specimen: Blood   Result Value Ref Range    25 Hydroxy, Vitamin D 32.7 30.0 - 100.0 ng/ml         Assessment / Plan      Assessment/Plan:   Diagnoses and all orders for this visit:    1. Primary hypertension (Primary)  Assessment & Plan:  - BP improved today in office; recommend patient continue amlodipine and monitor BP at home      2. Encounter for screening for malignant neoplasm of colon  -     Ambulatory Referral For Screening Colonoscopy       Follow Up:   Return for Next scheduled follow up, Follow Up Dr. Uriostegui.      Benita Suh PA-C   [facility] [place]

## 2024-08-27 ENCOUNTER — OFFICE VISIT (OUTPATIENT)
Dept: INTERNAL MEDICINE | Facility: CLINIC | Age: 79
End: 2024-08-27
Payer: MEDICARE

## 2024-08-27 VITALS
OXYGEN SATURATION: 97 % | TEMPERATURE: 96.9 F | WEIGHT: 208 LBS | DIASTOLIC BLOOD PRESSURE: 88 MMHG | HEIGHT: 67 IN | SYSTOLIC BLOOD PRESSURE: 130 MMHG | HEART RATE: 84 BPM | BODY MASS INDEX: 32.65 KG/M2 | RESPIRATION RATE: 18 BRPM

## 2024-08-27 DIAGNOSIS — Z12.11 ENCOUNTER FOR SCREENING FOR MALIGNANT NEOPLASM OF COLON: ICD-10-CM

## 2024-08-27 DIAGNOSIS — I10 PRIMARY HYPERTENSION: Primary | Chronic | ICD-10-CM

## 2024-08-27 PROCEDURE — 1159F MED LIST DOCD IN RCRD: CPT

## 2024-08-27 PROCEDURE — 3075F SYST BP GE 130 - 139MM HG: CPT

## 2024-08-27 PROCEDURE — 99213 OFFICE O/P EST LOW 20 MIN: CPT

## 2024-08-27 PROCEDURE — 1160F RVW MEDS BY RX/DR IN RCRD: CPT

## 2024-08-27 PROCEDURE — 3079F DIAST BP 80-89 MM HG: CPT

## 2024-08-27 PROCEDURE — 1126F AMNT PAIN NOTED NONE PRSNT: CPT

## 2024-08-27 RX ORDER — CHOLECALCIFEROL (VITAMIN D3) 25 MCG
1000 TABLET ORAL DAILY
COMMUNITY

## 2024-12-19 ENCOUNTER — OUTSIDE FACILITY SERVICE (OUTPATIENT)
Dept: GASTROENTEROLOGY | Facility: CLINIC | Age: 79
End: 2024-12-19
Payer: MEDICARE

## 2024-12-19 PROCEDURE — 88305 TISSUE EXAM BY PATHOLOGIST: CPT | Performed by: INTERNAL MEDICINE

## 2024-12-19 PROCEDURE — G0500 MOD SEDAT ENDO SERVICE >5YRS: HCPCS | Performed by: INTERNAL MEDICINE

## 2024-12-19 PROCEDURE — 45385 COLONOSCOPY W/LESION REMOVAL: CPT | Performed by: INTERNAL MEDICINE

## 2024-12-20 ENCOUNTER — LAB REQUISITION (OUTPATIENT)
Dept: LAB | Facility: HOSPITAL | Age: 79
End: 2024-12-20
Payer: MEDICARE

## 2024-12-20 DIAGNOSIS — Z12.11 ENCOUNTER FOR SCREENING FOR MALIGNANT NEOPLASM OF COLON: ICD-10-CM
